# Patient Record
Sex: FEMALE | Race: WHITE | NOT HISPANIC OR LATINO
[De-identification: names, ages, dates, MRNs, and addresses within clinical notes are randomized per-mention and may not be internally consistent; named-entity substitution may affect disease eponyms.]

---

## 2017-06-29 ENCOUNTER — TRANSCRIPTION ENCOUNTER (OUTPATIENT)
Age: 27
End: 2017-06-29

## 2017-07-05 ENCOUNTER — ASOB RESULT (OUTPATIENT)
Age: 27
End: 2017-07-05

## 2017-07-05 ENCOUNTER — APPOINTMENT (OUTPATIENT)
Dept: ANTEPARTUM | Facility: CLINIC | Age: 27
End: 2017-07-05

## 2017-07-05 PROBLEM — Z00.00 ENCOUNTER FOR PREVENTIVE HEALTH EXAMINATION: Status: ACTIVE | Noted: 2017-07-05

## 2017-07-06 PROBLEM — O35.9XX0 FETAL ABNORMALITY IN PREGNANCY: Status: ACTIVE | Noted: 2017-07-06

## 2017-07-10 ENCOUNTER — LABORATORY RESULT (OUTPATIENT)
Age: 27
End: 2017-07-10

## 2017-07-10 ENCOUNTER — APPOINTMENT (OUTPATIENT)
Dept: ANTEPARTUM | Facility: CLINIC | Age: 27
End: 2017-07-10

## 2017-07-10 ENCOUNTER — ASOB RESULT (OUTPATIENT)
Age: 27
End: 2017-07-10

## 2017-07-10 ENCOUNTER — OUTPATIENT (OUTPATIENT)
Dept: OUTPATIENT SERVICES | Facility: HOSPITAL | Age: 27
LOS: 1 days | End: 2017-07-10
Payer: COMMERCIAL

## 2017-07-10 DIAGNOSIS — Z01.818 ENCOUNTER FOR OTHER PREPROCEDURAL EXAMINATION: ICD-10-CM

## 2017-07-10 PROCEDURE — 88291 CYTO/MOLECULAR REPORT: CPT

## 2017-07-11 ENCOUNTER — OUTPATIENT (OUTPATIENT)
Dept: OUTPATIENT SERVICES | Age: 27
LOS: 1 days | Discharge: HOME | End: 2017-07-11

## 2017-07-11 DIAGNOSIS — Z3A.21 21 WEEKS GESTATION OF PREGNANCY: ICD-10-CM

## 2017-07-11 DIAGNOSIS — O35.8XX0 MATERNAL CARE FOR OTHER (SUSPECTED) FETAL ABNORMALITY AND DAMAGE, NOT APPLICABLE OR UNSPECIFIED: ICD-10-CM

## 2017-07-12 ENCOUNTER — APPOINTMENT (OUTPATIENT)
Dept: PEDIATRIC CARDIOLOGY | Facility: CLINIC | Age: 27
End: 2017-07-12

## 2017-07-12 DIAGNOSIS — O35.9XX0 MATERNAL CARE FOR (SUSPECTED) FETAL ABNORMALITY AND DAMAGE, UNSPECIFIED, NOT APPLICABLE OR UNSPECIFIED: ICD-10-CM

## 2017-07-17 LAB
CHROM ANALY OVERALL INTERP SPEC-IMP: SIGNIFICANT CHANGE UP

## 2017-11-21 ENCOUNTER — INPATIENT (INPATIENT)
Facility: HOSPITAL | Age: 27
LOS: 2 days | Discharge: HOME | End: 2017-11-24
Attending: OBSTETRICS & GYNECOLOGY | Admitting: OBSTETRICS & GYNECOLOGY
Payer: COMMERCIAL

## 2017-11-21 RX ORDER — SODIUM CHLORIDE 9 MG/ML
1000 INJECTION, SOLUTION INTRAVENOUS
Refills: 0 | Status: DISCONTINUED | OUTPATIENT
Start: 2017-11-21 | End: 2017-11-22

## 2017-11-21 RX ORDER — OXYTOCIN 10 UNIT/ML
333.33 VIAL (ML) INJECTION
Qty: 20 | Refills: 0 | Status: DISCONTINUED | OUTPATIENT
Start: 2017-11-21 | End: 2017-11-22

## 2017-11-21 RX ORDER — CITRIC ACID/SODIUM CITRATE 300-500 MG
15 SOLUTION, ORAL ORAL EVERY 4 HOURS
Refills: 0 | Status: DISCONTINUED | OUTPATIENT
Start: 2017-11-21 | End: 2017-11-22

## 2017-11-21 RX ORDER — AMPICILLIN TRIHYDRATE 250 MG
1 CAPSULE ORAL EVERY 4 HOURS
Refills: 0 | Status: DISCONTINUED | OUTPATIENT
Start: 2017-11-21 | End: 2017-11-24

## 2017-11-21 RX ORDER — AMPICILLIN TRIHYDRATE 250 MG
2 CAPSULE ORAL ONCE
Refills: 0 | Status: COMPLETED | OUTPATIENT
Start: 2017-11-21 | End: 2017-11-22

## 2017-11-21 RX ORDER — SODIUM CHLORIDE 9 MG/ML
1000 INJECTION, SOLUTION INTRAVENOUS ONCE
Refills: 0 | Status: COMPLETED | OUTPATIENT
Start: 2017-11-21 | End: 2017-11-21

## 2017-11-21 RX ADMIN — SODIUM CHLORIDE 2000 MILLILITER(S): 9 INJECTION, SOLUTION INTRAVENOUS at 23:45

## 2017-11-22 VITALS — WEIGHT: 138.89 LBS | HEIGHT: 65 IN

## 2017-11-22 LAB
BASOPHILS # BLD AUTO: 0 K/UL — SIGNIFICANT CHANGE UP (ref 0–0.2)
BASOPHILS NFR BLD AUTO: 0.4 % — SIGNIFICANT CHANGE UP (ref 0–2)
BLD GP AB SCN SERPL QL: NEGATIVE — SIGNIFICANT CHANGE UP
EOSINOPHIL # BLD AUTO: 0.1 K/UL — SIGNIFICANT CHANGE UP (ref 0–0.5)
EOSINOPHIL NFR BLD AUTO: 0.5 % — SIGNIFICANT CHANGE UP (ref 0–6)
HCT VFR BLD CALC: 36.7 % — SIGNIFICANT CHANGE UP (ref 34.5–45)
HGB BLD-MCNC: 12.2 G/DL — SIGNIFICANT CHANGE UP (ref 11.5–15.5)
LYMPHOCYTES # BLD AUTO: 2.6 K/UL — SIGNIFICANT CHANGE UP (ref 1–3.3)
LYMPHOCYTES # BLD AUTO: 22.5 % — SIGNIFICANT CHANGE UP (ref 13–44)
MCHC RBC-ENTMCNC: 29.6 PG — SIGNIFICANT CHANGE UP (ref 27–34)
MCHC RBC-ENTMCNC: 33.3 GM/DL — SIGNIFICANT CHANGE UP (ref 32–36)
MCV RBC AUTO: 88.9 FL — SIGNIFICANT CHANGE UP (ref 80–100)
MONOCYTES # BLD AUTO: 0.9 K/UL — SIGNIFICANT CHANGE UP (ref 0–0.9)
MONOCYTES NFR BLD AUTO: 7.5 % — SIGNIFICANT CHANGE UP (ref 2–14)
NEUTROPHILS # BLD AUTO: 8.1 K/UL — HIGH (ref 1.8–7.4)
NEUTROPHILS NFR BLD AUTO: 69.1 % — SIGNIFICANT CHANGE UP (ref 43–77)
PLATELET # BLD AUTO: 221 K/UL — SIGNIFICANT CHANGE UP (ref 150–400)
RBC # BLD: 4.14 M/UL — SIGNIFICANT CHANGE UP (ref 3.8–5.2)
RBC # FLD: 15.6 % — HIGH (ref 10.3–14.5)
RH IG SCN BLD-IMP: POSITIVE — SIGNIFICANT CHANGE UP
T PALLIDUM AB TITR SER: NEGATIVE — SIGNIFICANT CHANGE UP
WBC # BLD: 11.7 K/UL — HIGH (ref 3.8–10.5)
WBC # FLD AUTO: 11.7 K/UL — HIGH (ref 3.8–10.5)

## 2017-11-22 RX ORDER — LANOLIN
1 OINTMENT (GRAM) TOPICAL EVERY 6 HOURS
Refills: 0 | Status: DISCONTINUED | OUTPATIENT
Start: 2017-11-22 | End: 2017-11-24

## 2017-11-22 RX ORDER — OXYTOCIN 10 UNIT/ML
4 VIAL (ML) INJECTION
Qty: 30 | Refills: 0 | Status: DISCONTINUED | OUTPATIENT
Start: 2017-11-22 | End: 2017-11-24

## 2017-11-22 RX ORDER — ONDANSETRON 8 MG/1
4 TABLET, FILM COATED ORAL ONCE
Refills: 0 | Status: COMPLETED | OUTPATIENT
Start: 2017-11-22 | End: 2017-11-22

## 2017-11-22 RX ORDER — SODIUM CHLORIDE 9 MG/ML
3 INJECTION INTRAMUSCULAR; INTRAVENOUS; SUBCUTANEOUS EVERY 8 HOURS
Refills: 0 | Status: DISCONTINUED | OUTPATIENT
Start: 2017-11-22 | End: 2017-11-24

## 2017-11-22 RX ORDER — PRAMOXINE HYDROCHLORIDE 150 MG/15G
1 AEROSOL, FOAM RECTAL EVERY 4 HOURS
Refills: 0 | Status: DISCONTINUED | OUTPATIENT
Start: 2017-11-22 | End: 2017-11-24

## 2017-11-22 RX ORDER — SIMETHICONE 80 MG/1
80 TABLET, CHEWABLE ORAL EVERY 6 HOURS
Refills: 0 | Status: DISCONTINUED | OUTPATIENT
Start: 2017-11-22 | End: 2017-11-24

## 2017-11-22 RX ORDER — DIPHENHYDRAMINE HCL 50 MG
25 CAPSULE ORAL ONCE
Refills: 0 | Status: COMPLETED | OUTPATIENT
Start: 2017-11-22 | End: 2017-11-22

## 2017-11-22 RX ORDER — DIBUCAINE 1 %
1 OINTMENT (GRAM) RECTAL EVERY 4 HOURS
Refills: 0 | Status: DISCONTINUED | OUTPATIENT
Start: 2017-11-22 | End: 2017-11-22

## 2017-11-22 RX ORDER — HYDROCORTISONE 1 %
1 OINTMENT (GRAM) TOPICAL EVERY 4 HOURS
Refills: 0 | Status: DISCONTINUED | OUTPATIENT
Start: 2017-11-22 | End: 2017-11-22

## 2017-11-22 RX ORDER — KETOROLAC TROMETHAMINE 30 MG/ML
30 SYRINGE (ML) INJECTION ONCE
Refills: 0 | Status: DISCONTINUED | OUTPATIENT
Start: 2017-11-22 | End: 2017-11-22

## 2017-11-22 RX ORDER — GLYCERIN ADULT
1 SUPPOSITORY, RECTAL RECTAL AT BEDTIME
Refills: 0 | Status: DISCONTINUED | OUTPATIENT
Start: 2017-11-22 | End: 2017-11-24

## 2017-11-22 RX ORDER — SODIUM CHLORIDE 9 MG/ML
3 INJECTION INTRAMUSCULAR; INTRAVENOUS; SUBCUTANEOUS EVERY 8 HOURS
Refills: 0 | Status: DISCONTINUED | OUTPATIENT
Start: 2017-11-22 | End: 2017-11-22

## 2017-11-22 RX ORDER — IBUPROFEN 200 MG
600 TABLET ORAL EVERY 6 HOURS
Refills: 0 | Status: DISCONTINUED | OUTPATIENT
Start: 2017-11-22 | End: 2017-11-24

## 2017-11-22 RX ORDER — DIBUCAINE 1 %
1 OINTMENT (GRAM) RECTAL EVERY 4 HOURS
Refills: 0 | Status: DISCONTINUED | OUTPATIENT
Start: 2017-11-22 | End: 2017-11-24

## 2017-11-22 RX ORDER — ACETAMINOPHEN 500 MG
975 TABLET ORAL EVERY 6 HOURS
Refills: 0 | Status: DISCONTINUED | OUTPATIENT
Start: 2017-11-22 | End: 2017-11-24

## 2017-11-22 RX ORDER — MAGNESIUM HYDROXIDE 400 MG/1
30 TABLET, CHEWABLE ORAL
Refills: 0 | Status: DISCONTINUED | OUTPATIENT
Start: 2017-11-22 | End: 2017-11-24

## 2017-11-22 RX ORDER — OXYCODONE HYDROCHLORIDE 5 MG/1
5 TABLET ORAL
Refills: 0 | Status: DISCONTINUED | OUTPATIENT
Start: 2017-11-22 | End: 2017-11-24

## 2017-11-22 RX ORDER — DIPHENHYDRAMINE HCL 50 MG
25 CAPSULE ORAL EVERY 6 HOURS
Refills: 0 | Status: DISCONTINUED | OUTPATIENT
Start: 2017-11-22 | End: 2017-11-24

## 2017-11-22 RX ORDER — HYDROCORTISONE 1 %
1 OINTMENT (GRAM) TOPICAL EVERY 4 HOURS
Refills: 0 | Status: DISCONTINUED | OUTPATIENT
Start: 2017-11-22 | End: 2017-11-24

## 2017-11-22 RX ORDER — PRAMOXINE HYDROCHLORIDE 150 MG/15G
1 AEROSOL, FOAM RECTAL EVERY 4 HOURS
Refills: 0 | Status: DISCONTINUED | OUTPATIENT
Start: 2017-11-22 | End: 2017-11-22

## 2017-11-22 RX ORDER — DOCUSATE SODIUM 100 MG
100 CAPSULE ORAL
Refills: 0 | Status: DISCONTINUED | OUTPATIENT
Start: 2017-11-22 | End: 2017-11-24

## 2017-11-22 RX ORDER — TETANUS TOXOID, REDUCED DIPHTHERIA TOXOID AND ACELLULAR PERTUSSIS VACCINE, ADSORBED 5; 2.5; 8; 8; 2.5 [IU]/.5ML; [IU]/.5ML; UG/.5ML; UG/.5ML; UG/.5ML
0.5 SUSPENSION INTRAMUSCULAR ONCE
Refills: 0 | Status: DISCONTINUED | OUTPATIENT
Start: 2017-11-22 | End: 2017-11-24

## 2017-11-22 RX ORDER — OXYTOCIN 10 UNIT/ML
41.67 VIAL (ML) INJECTION
Qty: 20 | Refills: 0 | Status: DISCONTINUED | OUTPATIENT
Start: 2017-11-22 | End: 2017-11-24

## 2017-11-22 RX ORDER — AER TRAVELER 0.5 G/1
1 SOLUTION RECTAL; TOPICAL EVERY 4 HOURS
Refills: 0 | Status: DISCONTINUED | OUTPATIENT
Start: 2017-11-22 | End: 2017-11-24

## 2017-11-22 RX ORDER — AER TRAVELER 0.5 G/1
1 SOLUTION RECTAL; TOPICAL EVERY 4 HOURS
Refills: 0 | Status: DISCONTINUED | OUTPATIENT
Start: 2017-11-22 | End: 2017-11-22

## 2017-11-22 RX ORDER — OXYCODONE HYDROCHLORIDE 5 MG/1
5 TABLET ORAL EVERY 4 HOURS
Refills: 0 | Status: DISCONTINUED | OUTPATIENT
Start: 2017-11-22 | End: 2017-11-24

## 2017-11-22 RX ORDER — IBUPROFEN 200 MG
600 TABLET ORAL EVERY 6 HOURS
Refills: 0 | Status: COMPLETED | OUTPATIENT
Start: 2017-11-22 | End: 2018-10-21

## 2017-11-22 RX ADMIN — ONDANSETRON 4 MILLIGRAM(S): 8 TABLET, FILM COATED ORAL at 08:46

## 2017-11-22 RX ADMIN — SODIUM CHLORIDE 125 MILLILITER(S): 9 INJECTION, SOLUTION INTRAVENOUS at 00:18

## 2017-11-22 RX ADMIN — Medication 600 MILLIGRAM(S): at 21:30

## 2017-11-22 RX ADMIN — Medication 15 MILLILITER(S): at 02:14

## 2017-11-22 RX ADMIN — Medication 125 MILLIUNIT(S)/MIN: at 11:07

## 2017-11-22 RX ADMIN — Medication 4 MILLIUNIT(S)/MIN: at 04:15

## 2017-11-22 RX ADMIN — ONDANSETRON 4 MILLIGRAM(S): 8 TABLET, FILM COATED ORAL at 13:36

## 2017-11-22 RX ADMIN — Medication 25 MILLIGRAM(S): at 06:30

## 2017-11-22 RX ADMIN — Medication 0.2 MILLIGRAM(S): at 20:33

## 2017-11-22 RX ADMIN — Medication 0.2 MILLIGRAM(S): at 10:23

## 2017-11-22 RX ADMIN — Medication 208 GRAM(S): at 04:10

## 2017-11-22 RX ADMIN — Medication 216 GRAM(S): at 00:09

## 2017-11-22 RX ADMIN — Medication 0.2 MILLIGRAM(S): at 16:31

## 2017-11-22 RX ADMIN — Medication 208 GRAM(S): at 08:01

## 2017-11-22 RX ADMIN — Medication 600 MILLIGRAM(S): at 20:33

## 2017-11-22 RX ADMIN — Medication 30 MILLIGRAM(S): at 14:09

## 2017-11-23 LAB
HCT VFR BLD CALC: 31.5 % — LOW (ref 34.5–45)
HGB BLD-MCNC: 10.4 G/DL — LOW (ref 11.5–15.5)

## 2017-11-23 RX ADMIN — Medication 0.2 MILLIGRAM(S): at 11:11

## 2017-11-23 RX ADMIN — Medication 0.2 MILLIGRAM(S): at 00:57

## 2017-11-23 RX ADMIN — Medication 600 MILLIGRAM(S): at 11:14

## 2017-11-23 RX ADMIN — Medication 0.2 MILLIGRAM(S): at 05:06

## 2017-11-23 RX ADMIN — Medication 1 TABLET(S): at 11:11

## 2017-11-23 RX ADMIN — Medication 600 MILLIGRAM(S): at 12:00

## 2017-11-23 RX ADMIN — Medication 600 MILLIGRAM(S): at 05:05

## 2017-11-23 RX ADMIN — Medication 600 MILLIGRAM(S): at 06:16

## 2017-11-23 NOTE — PROGRESS NOTE ADULT - SUBJECTIVE AND OBJECTIVE BOX
PPD#1- ATTENDING NOTE    S: Patient doing well. Minimal lochia. Pain controlled.    O: Vital Signs Last 24 Hrs  T(C): 36.8 (23 Nov 2017 05:22), Max: 36.9 (22 Nov 2017 15:20)  T(F): 98.2 (23 Nov 2017 05:22), Max: 98.4 (22 Nov 2017 15:20)  HR: 76 (23 Nov 2017 05:22) (76 - 102)  BP: 109/70 (23 Nov 2017 05:22) (13/- - 150/76)  BP(mean): --  RR: 18 (23 Nov 2017 05:22) (15 - 18)  SpO2: 97% (22 Nov 2017 17:05) (94% - 98%)    Gen: NAD  Abd: soft, NT, ND, fundus firm below umbilicus -2  Lochia: moderate  Ext: no tenderness, no hyper reflexia,     Labs:                        10.4   x     )-----------( x        ( 23 Nov 2017 08:57 )             31.5       A:Office [phone]  Dr. Chavez PPD#1- ATTENDING NOTE    S: Patient doing well. Minimal lochia. Pain controlled.    O: Vital Signs Last 24 Hrs  T(C): 36.8 (23 Nov 2017 05:22), Max: 36.9 (22 Nov 2017 15:20)  T(F): 98.2 (23 Nov 2017 05:22), Max: 98.4 (22 Nov 2017 15:20)  HR: 76 (23 Nov 2017 05:22) (76 - 102)  BP: 109/70 (23 Nov 2017 05:22) (13/- - 150/76)  BP(mean): --  RR: 18 (23 Nov 2017 05:22) (15 - 18)  SpO2: 97% (22 Nov 2017 17:05) (94% - 98%)    Gen: NAD  Abd: soft, NT, ND, fundus firm below umbilicus -2  Lochia: moderate  Ext: no tenderness, no hyper reflexia,     Labs:                        10.4   x     )-----------( x        ( 23 Nov 2017 08:57 )             31.5       A:Office [phone]  Dr. Chavez PPD#1- ATTENDING NOTE    S: Patient doing well. Minimal lochia. Pain controlled.    O: Vital Signs Last 24 Hrs  T(C): 36.8 (23 Nov 2017 05:22), Max: 36.9 (22 Nov 2017 15:20)  T(F): 98.2 (23 Nov 2017 05:22), Max: 98.4 (22 Nov 2017 15:20)  HR: 76 (23 Nov 2017 05:22) (76 - 102)  BP: 109/70 (23 Nov 2017 05:22) (13/- - 150/76)  BP(mean): --  RR: 18 (23 Nov 2017 05:22) (15 - 18)  SpO2: 97% (22 Nov 2017 17:05) (94% - 98%)    Gen: NAD  Abd: soft, NT, ND, fundus firm below umbilicus -2  Lochia: moderate  Ext: no tenderness, no hyper reflexia,     Labs:                        10.4   x     )-----------( x        ( 23 Nov 2017 08:57 )             31.5       A:Office [phone]  Dr. Chavez

## 2017-11-23 NOTE — PROGRESS NOTE ADULT - PROBLEM SELECTOR PLAN 1
27y PPD#1 s/p  doing well.    Plan:  Analgesia prn  Regular diet  Follow up am labs  Routine post partum care
Increase OOB  Regular diet  PO Pain protocol  AM H&H  Routine Postpartum Care  Continue Methergine series, fundus firm, bleeding WNL    Jessica Ledesma PA-C

## 2017-11-23 NOTE — PROGRESS NOTE ADULT - SUBJECTIVE AND OBJECTIVE BOX
Postpartum Note- PPD#1    Allergies    No Known Allergies    Blood Type O   Positive    RPR : Negative    Rubella: Immune    S: Patient is a  27y    P 1001     PPD#1         S/P    Patient states she still feels her bleeding is heavy, pain is controlled.    Pt is OOB, tolerating PO, passing flatus.     Feeding: Breast    O:  Vital Signs Last 24 Hrs  T(C): 36.8 (2017 05:22), Max: 36.9 (2017 15:20)  T(F): 98.2 (2017 05:22), Max: 98.4 (2017 15:20)  HR: 76 (2017 05:22) (76 - 102)  BP: 109/70 (2017 05:22) (13/- - 150/76)  BP(mean): --  RR: 18 (2017 05:22) (15 - 18)  SpO2: 97% (2017 17:05) (94% - 98%)     Gen: NAD  Abdomen: Soft, nontender, non-distended, fundus firm.  Vaginal: Lochia WNL  Ext: Neg calf tenderness    LABS:    Hemoglobin: 12.2 g/dL (11-22 @ 00:10)      Hematocrit: 36.7 % (11- @ 00:10)        PMHx: none  Current Issues: Atony at delivery with --> pt currently on methergine series

## 2017-11-24 ENCOUNTER — TRANSCRIPTION ENCOUNTER (OUTPATIENT)
Age: 27
End: 2017-11-24

## 2017-11-24 VITALS
TEMPERATURE: 98 F | HEART RATE: 87 BPM | DIASTOLIC BLOOD PRESSURE: 69 MMHG | SYSTOLIC BLOOD PRESSURE: 106 MMHG | RESPIRATION RATE: 18 BRPM

## 2017-11-24 RX ORDER — ACETAMINOPHEN 500 MG
3 TABLET ORAL
Qty: 0 | Refills: 0 | DISCHARGE
Start: 2017-11-24

## 2017-11-24 RX ORDER — IBUPROFEN 200 MG
1 TABLET ORAL
Qty: 0 | Refills: 0 | DISCHARGE
Start: 2017-11-24

## 2017-11-24 RX ADMIN — Medication 600 MILLIGRAM(S): at 06:06

## 2017-11-24 RX ADMIN — Medication 600 MILLIGRAM(S): at 05:19

## 2017-11-24 NOTE — DISCHARGE NOTE OB - PATIENT PORTAL LINK FT
“You can access the FollowHealth Patient Portal, offered by [facility], by registering with the following website: [email or web address]” “You can access the FollowHealth Patient Portal, offered by [facility], by registering with the following website: [email or web address]” “You can access the FollowHealth Patient Portal, offered by [facility], by registering with the following website: [email or web address]”

## 2017-11-24 NOTE — PROGRESS NOTE ADULT - SUBJECTIVE AND OBJECTIVE BOX
BROOKLYN PUENTE  MRN-[id number]    Subjective:DOING WELL. HOME TODAY    Vital Signs Last 24 Hrs  T(C): 36.8 (2017 05:37), Max: 36.9 (2017 18:00)  T(F): 98.2 (2017 05:37), Max: 98.5 (2017 18:00)  HR: 87 (2017 05:37) (80 - 87)  BP: 106/69 (2017 05:37) (105/71 - 106/69)  BP(mean): --  RR: 18 (2017 05:37) (18 - 18)  SpO2: --    PHYSICAL EXAM:      Constitutional:W/D, WF, NAD        Abdomen: Firm fundus  Pelvic: Lochia mild                                        REVIEW OF SYSTEMS      General:	    Skin/Breast:  		  Gastrointestinal:	    Genitourinary:	      MEDICATIONS  (STANDING):  acetaminophen   Tablet. 975 milliGRAM(s) Oral every 6 hours  ampicillin  IVPB 1 Gram(s) IV Intermittent every 4 hours  diphtheria/tetanus/pertussis (acellular) Vaccine (ADAcel) 0.5 milliLiter(s) IntraMuscular once  ibuprofen  Tablet 600 milliGRAM(s) Oral every 6 hours  misoprostol 1000 MICROGram(s) Vaginal once  oxyCODONE    IR 5 milliGRAM(s) Oral every 3 hours  oxytocin Infusion 41.667 milliUNIT(s)/Min (125 mL/Hr) IV Continuous <Continuous>  oxytocin Infusion 4 milliUNIT(s)/Min (4 mL/Hr) IV Continuous <Continuous>  prenatal multivitamin 1 Tablet(s) Oral daily  sodium chloride 0.9% lock flush 3 milliLiter(s) IV Push every 8 hours    MEDICATIONS  (PRN):  dibucaine 1% Ointment 1 Application(s) Topical every 4 hours PRN Perineal Discomfort  diphenhydrAMINE   Capsule 25 milliGRAM(s) Oral every 6 hours PRN Itching  docusate sodium 100 milliGRAM(s) Oral two times a day PRN Stool Softening  glycerin Suppository - Adult 1 Suppository(s) Rectal at bedtime PRN Constipation  hydrocortisone 1% Cream 1 Application(s) Topical every 4 hours PRN Moderate to Severe Perineal Pain  lanolin Ointment 1 Application(s) Topical every 6 hours PRN Sore Nipples  magnesium hydroxide Suspension 30 milliLiter(s) Oral two times a day PRN Constipation  oxyCODONE    IR 5 milliGRAM(s) Oral every 4 hours PRN Severe Pain (7 -10)  pramoxine 1%/zinc 5% Cream 1 Application(s) Topical every 4 hours PRN Moderate to Severe Perineal Pain  simethicone 80 milliGRAM(s) Chew every 6 hours PRN Gas  witch hazel Pads 1 Application(s) Topical every 4 hours PRN Perineal Discomfort    Allergies    No Known Allergies    Intolerances        Impression:PPD#2 - STABLE        Plan:D/C HOME. INSTR GIVEN. F/U 6 WEEKS BROOKLYN PUENTE  MRN-[id number]    Subjective:DOING WELL. HOME TODAY    Vital Signs Last 24 Hrs  T(C): 36.8 (2017 05:37), Max: 36.9 (2017 18:00)  T(F): 98.2 (2017 05:37), Max: 98.5 (2017 18:00)  HR: 87 (2017 05:37) (80 - 87)  BP: 106/69 (2017 05:37) (105/71 - 106/69)  BP(mean): --  RR: 18 (2017 05:37) (18 - 18)  SpO2: --    PHYSICAL EXAM:      Constitutional:W/D, WF, NAD        Abdomen: Firm fundus  Pelvic: Lochia mild                                        REVIEW OF SYSTEMS      General:	    Skin/Breast:  		  Gastrointestinal:	    Genitourinary:	      MEDICATIONS  (STANDING):  acetaminophen   Tablet. 975 milliGRAM(s) Oral every 6 hours  ampicillin  IVPB 1 Gram(s) IV Intermittent every 4 hours  diphtheria/tetanus/pertussis (acellular) Vaccine (ADAcel) 0.5 milliLiter(s) IntraMuscular once  ibuprofen  Tablet 600 milliGRAM(s) Oral every 6 hours  misoprostol 1000 MICROGram(s) Vaginal once  oxyCODONE    IR 5 milliGRAM(s) Oral every 3 hours  oxytocin Infusion 41.667 milliUNIT(s)/Min (125 mL/Hr) IV Continuous <Continuous>  oxytocin Infusion 4 milliUNIT(s)/Min (4 mL/Hr) IV Continuous <Continuous>  prenatal multivitamin 1 Tablet(s) Oral daily  sodium chloride 0.9% lock flush 3 milliLiter(s) IV Push every 8 hours    MEDICATIONS  (PRN):  dibucaine 1% Ointment 1 Application(s) Topical every 4 hours PRN Perineal Discomfort  diphenhydrAMINE   Capsule 25 milliGRAM(s) Oral every 6 hours PRN Itching  docusate sodium 100 milliGRAM(s) Oral two times a day PRN Stool Softening  glycerin Suppository - Adult 1 Suppository(s) Rectal at bedtime PRN Constipation  hydrocortisone 1% Cream 1 Application(s) Topical every 4 hours PRN Moderate to Severe Perineal Pain  lanolin Ointment 1 Application(s) Topical every 6 hours PRN Sore Nipples  magnesium hydroxide Suspension 30 milliLiter(s) Oral two times a day PRN Constipation  oxyCODONE    IR 5 milliGRAM(s) Oral every 4 hours PRN Severe Pain (7 -10)  pramoxine 1%/zinc 5% Cream 1 Application(s) Topical every 4 hours PRN Moderate to Severe Perineal Pain  simethicone 80 milliGRAM(s) Chew every 6 hours PRN Gas  witch hazel Pads 1 Application(s) Topical every 4 hours PRN Perineal Discomfort    Allergies    No Known Allergies    Intolerances        Impression:PPD#2 - STABLE        Plan:D/C HOME. INSTR GIVEN. F/U 6 WEEKS BROOKLYN PUENTE  MRN-[id number]    Subjective:DOING WELL. HOME TODAY    Vital Signs Last 24 Hrs  T(C): 36.8 (2017 05:37), Max: 36.9 (2017 18:00)  T(F): 98.2 (2017 05:37), Max: 98.5 (2017 18:00)  HR: 87 (2017 05:37) (80 - 87)  BP: 106/69 (2017 05:37) (105/71 - 106/69)  BP(mean): --  RR: 18 (2017 05:37) (18 - 18)  SpO2: --    PHYSICAL EXAM:      Constitutional:W/D, WF, NAD        Abdomen: Firm fundus  Pelvic: Lochia mild                                        REVIEW OF SYSTEMS      General:	    Skin/Breast:  		  Gastrointestinal:	    Genitourinary:	      MEDICATIONS  (STANDING):  acetaminophen   Tablet. 975 milliGRAM(s) Oral every 6 hours  ampicillin  IVPB 1 Gram(s) IV Intermittent every 4 hours  diphtheria/tetanus/pertussis (acellular) Vaccine (ADAcel) 0.5 milliLiter(s) IntraMuscular once  ibuprofen  Tablet 600 milliGRAM(s) Oral every 6 hours  misoprostol 1000 MICROGram(s) Vaginal once  oxyCODONE    IR 5 milliGRAM(s) Oral every 3 hours  oxytocin Infusion 41.667 milliUNIT(s)/Min (125 mL/Hr) IV Continuous <Continuous>  oxytocin Infusion 4 milliUNIT(s)/Min (4 mL/Hr) IV Continuous <Continuous>  prenatal multivitamin 1 Tablet(s) Oral daily  sodium chloride 0.9% lock flush 3 milliLiter(s) IV Push every 8 hours    MEDICATIONS  (PRN):  dibucaine 1% Ointment 1 Application(s) Topical every 4 hours PRN Perineal Discomfort  diphenhydrAMINE   Capsule 25 milliGRAM(s) Oral every 6 hours PRN Itching  docusate sodium 100 milliGRAM(s) Oral two times a day PRN Stool Softening  glycerin Suppository - Adult 1 Suppository(s) Rectal at bedtime PRN Constipation  hydrocortisone 1% Cream 1 Application(s) Topical every 4 hours PRN Moderate to Severe Perineal Pain  lanolin Ointment 1 Application(s) Topical every 6 hours PRN Sore Nipples  magnesium hydroxide Suspension 30 milliLiter(s) Oral two times a day PRN Constipation  oxyCODONE    IR 5 milliGRAM(s) Oral every 4 hours PRN Severe Pain (7 -10)  pramoxine 1%/zinc 5% Cream 1 Application(s) Topical every 4 hours PRN Moderate to Severe Perineal Pain  simethicone 80 milliGRAM(s) Chew every 6 hours PRN Gas  witch hazel Pads 1 Application(s) Topical every 4 hours PRN Perineal Discomfort    Allergies    No Known Allergies    Intolerances        Impression:PPD#2 - STABLE        Plan:D/C HOME. INSTR GIVEN. F/U 6 WEEKS

## 2017-11-24 NOTE — DISCHARGE NOTE OB - CARE PLAN
Principal Discharge DX:	Vaginal delivery  Goal:	D/C HOME  Instructions for follow-up, activity and diet:	PPV 6 WEEKS

## 2017-11-24 NOTE — DISCHARGE NOTE OB - MEDICATION SUMMARY - MEDICATIONS TO TAKE
I will START or STAY ON the medications listed below when I get home from the hospital:    acetaminophen 325 mg oral tablet  -- 3 tab(s) by mouth every 6 hours  -- Indication: For Vaginal delivery    ibuprofen 600 mg oral tablet  -- 1 tab(s) by mouth every 6 hours  -- Indication: For Vaginal delivery    PNV Prenatal oral tablet  -- 1 tab(s) by mouth once a day  -- Indication: For Vaginal delivery

## 2017-11-24 NOTE — DISCHARGE NOTE OB - CARE PROVIDER_API CALL
Alfonso Goodwin), Obstetrics and Gynecology  [address] [place] [address]  Phone: [phone]  Fax: [phone] Alfonso Goodwin), Obstetrics and Gynecology  [address] [place] [address]  Phone: [phone]  Fax: [phone] Alfonso Goodwin), Obstetrics and Gynecology  [address] [place] [address]  Phone: [phone]  Fax: [phone]

## 2017-11-24 NOTE — DISCHARGE NOTE OB - CARE PROVIDERS DIRECT ADDRESSES
[email or web address]

## 2018-01-11 ENCOUNTER — RESULT REVIEW (OUTPATIENT)
Age: 28
End: 2018-01-11

## 2018-09-24 ENCOUNTER — APPOINTMENT (OUTPATIENT)
Dept: OBGYN | Facility: CLINIC | Age: 28
End: 2018-09-24

## 2018-09-27 ENCOUNTER — OUTPATIENT (OUTPATIENT)
Dept: OUTPATIENT SERVICES | Facility: HOSPITAL | Age: 28
LOS: 1 days | Discharge: HOME | End: 2018-09-27

## 2018-09-28 DIAGNOSIS — F41.1 GENERALIZED ANXIETY DISORDER: ICD-10-CM

## 2019-03-24 ENCOUNTER — TRANSCRIPTION ENCOUNTER (OUTPATIENT)
Age: 29
End: 2019-03-24

## 2019-05-01 ENCOUNTER — RESULT REVIEW (OUTPATIENT)
Age: 29
End: 2019-05-01

## 2019-12-11 ENCOUNTER — ASOB RESULT (OUTPATIENT)
Age: 29
End: 2019-12-11

## 2019-12-11 ENCOUNTER — APPOINTMENT (OUTPATIENT)
Dept: ANTEPARTUM | Facility: CLINIC | Age: 29
End: 2019-12-11
Payer: COMMERCIAL

## 2019-12-11 PROCEDURE — 76805 OB US >/= 14 WKS SNGL FETUS: CPT

## 2020-01-08 ENCOUNTER — APPOINTMENT (OUTPATIENT)
Dept: ANTEPARTUM | Facility: CLINIC | Age: 30
End: 2020-01-08
Payer: COMMERCIAL

## 2020-01-08 ENCOUNTER — ASOB RESULT (OUTPATIENT)
Age: 30
End: 2020-01-08

## 2020-01-08 PROCEDURE — 76811 OB US DETAILED SNGL FETUS: CPT

## 2020-01-08 PROCEDURE — 76817 TRANSVAGINAL US OBSTETRIC: CPT

## 2020-04-26 ENCOUNTER — MESSAGE (OUTPATIENT)
Age: 30
End: 2020-04-26

## 2020-05-22 ENCOUNTER — INPATIENT (INPATIENT)
Facility: HOSPITAL | Age: 30
LOS: 0 days | Discharge: HOME | End: 2020-05-23
Attending: OBSTETRICS & GYNECOLOGY | Admitting: OBSTETRICS & GYNECOLOGY
Payer: COMMERCIAL

## 2020-05-22 VITALS — WEIGHT: 138.89 LBS | HEIGHT: 65 IN

## 2020-05-22 LAB
BASOPHILS # BLD AUTO: 0 K/UL — SIGNIFICANT CHANGE UP (ref 0–0.2)
BASOPHILS NFR BLD AUTO: 0 % — SIGNIFICANT CHANGE UP (ref 0–2)
BLD GP AB SCN SERPL QL: NEGATIVE — SIGNIFICANT CHANGE UP
EOSINOPHIL # BLD AUTO: 0.19 K/UL — SIGNIFICANT CHANGE UP (ref 0–0.5)
EOSINOPHIL NFR BLD AUTO: 1.7 % — SIGNIFICANT CHANGE UP (ref 0–6)
HCT VFR BLD CALC: 32.4 % — LOW (ref 34.5–45)
HGB BLD-MCNC: 10.5 G/DL — LOW (ref 11.5–15.5)
LYMPHOCYTES # BLD AUTO: 2.56 K/UL — SIGNIFICANT CHANGE UP (ref 1–3.3)
LYMPHOCYTES # BLD AUTO: 23.5 % — SIGNIFICANT CHANGE UP (ref 13–44)
MANUAL SMEAR VERIFICATION: SIGNIFICANT CHANGE UP
MCHC RBC-ENTMCNC: 28.3 PG — SIGNIFICANT CHANGE UP (ref 27–34)
MCHC RBC-ENTMCNC: 32.4 GM/DL — SIGNIFICANT CHANGE UP (ref 32–36)
MCV RBC AUTO: 87.3 FL — SIGNIFICANT CHANGE UP (ref 80–100)
METAMYELOCYTES # FLD: 0.9 % — HIGH (ref 0–0)
MONOCYTES # BLD AUTO: 0.57 K/UL — SIGNIFICANT CHANGE UP (ref 0–0.9)
MONOCYTES NFR BLD AUTO: 5.2 % — SIGNIFICANT CHANGE UP (ref 2–14)
NEUTROPHILS # BLD AUTO: 7.49 K/UL — HIGH (ref 1.8–7.4)
NEUTROPHILS NFR BLD AUTO: 68.7 % — SIGNIFICANT CHANGE UP (ref 43–77)
PLAT MORPH BLD: NORMAL — SIGNIFICANT CHANGE UP
PLATELET # BLD AUTO: 236 K/UL — SIGNIFICANT CHANGE UP (ref 150–400)
RBC # BLD: 3.71 M/UL — LOW (ref 3.8–5.2)
RBC # FLD: 14.2 % — SIGNIFICANT CHANGE UP (ref 10.3–14.5)
RBC BLD AUTO: NORMAL — SIGNIFICANT CHANGE UP
RH IG SCN BLD-IMP: POSITIVE — SIGNIFICANT CHANGE UP
SARS-COV-2 RNA SPEC QL NAA+PROBE: SIGNIFICANT CHANGE UP
T PALLIDUM AB TITR SER: NEGATIVE — SIGNIFICANT CHANGE UP
WBC # BLD: 10.9 K/UL — HIGH (ref 3.8–10.5)
WBC # FLD AUTO: 10.9 K/UL — HIGH (ref 3.8–10.5)

## 2020-05-22 RX ORDER — TETANUS TOXOID, REDUCED DIPHTHERIA TOXOID AND ACELLULAR PERTUSSIS VACCINE, ADSORBED 5; 2.5; 8; 8; 2.5 [IU]/.5ML; [IU]/.5ML; UG/.5ML; UG/.5ML; UG/.5ML
0.5 SUSPENSION INTRAMUSCULAR ONCE
Refills: 0 | Status: DISCONTINUED | OUTPATIENT
Start: 2020-05-22 | End: 2020-05-23

## 2020-05-22 RX ORDER — DIBUCAINE 1 %
1 OINTMENT (GRAM) RECTAL EVERY 6 HOURS
Refills: 0 | Status: DISCONTINUED | OUTPATIENT
Start: 2020-05-22 | End: 2020-05-23

## 2020-05-22 RX ORDER — OXYTOCIN 10 UNIT/ML
4 VIAL (ML) INJECTION
Qty: 30 | Refills: 0 | Status: DISCONTINUED | OUTPATIENT
Start: 2020-05-22 | End: 2020-05-23

## 2020-05-22 RX ORDER — OXYTOCIN 10 UNIT/ML
333.33 VIAL (ML) INJECTION
Qty: 20 | Refills: 0 | Status: DISCONTINUED | OUTPATIENT
Start: 2020-05-22 | End: 2020-05-23

## 2020-05-22 RX ORDER — IBUPROFEN 200 MG
600 TABLET ORAL EVERY 6 HOURS
Refills: 0 | Status: DISCONTINUED | OUTPATIENT
Start: 2020-05-22 | End: 2020-05-23

## 2020-05-22 RX ORDER — ACETAMINOPHEN 500 MG
975 TABLET ORAL
Refills: 0 | Status: DISCONTINUED | OUTPATIENT
Start: 2020-05-22 | End: 2020-05-23

## 2020-05-22 RX ORDER — KETOROLAC TROMETHAMINE 30 MG/ML
30 SYRINGE (ML) INJECTION ONCE
Refills: 0 | Status: DISCONTINUED | OUTPATIENT
Start: 2020-05-22 | End: 2020-05-22

## 2020-05-22 RX ORDER — PRAMOXINE HYDROCHLORIDE 150 MG/15G
1 AEROSOL, FOAM RECTAL EVERY 4 HOURS
Refills: 0 | Status: DISCONTINUED | OUTPATIENT
Start: 2020-05-22 | End: 2020-05-23

## 2020-05-22 RX ORDER — SIMETHICONE 80 MG/1
80 TABLET, CHEWABLE ORAL EVERY 4 HOURS
Refills: 0 | Status: DISCONTINUED | OUTPATIENT
Start: 2020-05-22 | End: 2020-05-23

## 2020-05-22 RX ORDER — LANOLIN
1 OINTMENT (GRAM) TOPICAL EVERY 6 HOURS
Refills: 0 | Status: DISCONTINUED | OUTPATIENT
Start: 2020-05-22 | End: 2020-05-23

## 2020-05-22 RX ORDER — SODIUM CHLORIDE 9 MG/ML
1000 INJECTION, SOLUTION INTRAVENOUS
Refills: 0 | Status: DISCONTINUED | OUTPATIENT
Start: 2020-05-22 | End: 2020-05-22

## 2020-05-22 RX ORDER — BENZOCAINE 10 %
1 GEL (GRAM) MUCOUS MEMBRANE EVERY 6 HOURS
Refills: 0 | Status: DISCONTINUED | OUTPATIENT
Start: 2020-05-22 | End: 2020-05-23

## 2020-05-22 RX ORDER — OXYCODONE HYDROCHLORIDE 5 MG/1
5 TABLET ORAL
Refills: 0 | Status: DISCONTINUED | OUTPATIENT
Start: 2020-05-22 | End: 2020-05-23

## 2020-05-22 RX ORDER — MAGNESIUM HYDROXIDE 400 MG/1
30 TABLET, CHEWABLE ORAL
Refills: 0 | Status: DISCONTINUED | OUTPATIENT
Start: 2020-05-22 | End: 2020-05-23

## 2020-05-22 RX ORDER — SODIUM CHLORIDE 9 MG/ML
3 INJECTION INTRAMUSCULAR; INTRAVENOUS; SUBCUTANEOUS EVERY 8 HOURS
Refills: 0 | Status: DISCONTINUED | OUTPATIENT
Start: 2020-05-22 | End: 2020-05-23

## 2020-05-22 RX ORDER — CITRIC ACID/SODIUM CITRATE 300-500 MG
15 SOLUTION, ORAL ORAL EVERY 6 HOURS
Refills: 0 | Status: DISCONTINUED | OUTPATIENT
Start: 2020-05-22 | End: 2020-05-22

## 2020-05-22 RX ORDER — OXYCODONE HYDROCHLORIDE 5 MG/1
5 TABLET ORAL ONCE
Refills: 0 | Status: DISCONTINUED | OUTPATIENT
Start: 2020-05-22 | End: 2020-05-23

## 2020-05-22 RX ORDER — HYDROCORTISONE 1 %
1 OINTMENT (GRAM) TOPICAL EVERY 6 HOURS
Refills: 0 | Status: DISCONTINUED | OUTPATIENT
Start: 2020-05-22 | End: 2020-05-23

## 2020-05-22 RX ORDER — DIPHENHYDRAMINE HCL 50 MG
25 CAPSULE ORAL EVERY 6 HOURS
Refills: 0 | Status: DISCONTINUED | OUTPATIENT
Start: 2020-05-22 | End: 2020-05-23

## 2020-05-22 RX ORDER — AER TRAVELER 0.5 G/1
1 SOLUTION RECTAL; TOPICAL EVERY 4 HOURS
Refills: 0 | Status: DISCONTINUED | OUTPATIENT
Start: 2020-05-22 | End: 2020-05-23

## 2020-05-22 RX ORDER — IBUPROFEN 200 MG
600 TABLET ORAL EVERY 6 HOURS
Refills: 0 | Status: COMPLETED | OUTPATIENT
Start: 2020-05-22 | End: 2021-04-20

## 2020-05-22 RX ADMIN — Medication 975 MILLIGRAM(S): at 20:27

## 2020-05-22 RX ADMIN — Medication 30 MILLIGRAM(S): at 15:37

## 2020-05-22 RX ADMIN — Medication 4 MILLIUNIT(S)/MIN: at 09:00

## 2020-05-22 RX ADMIN — SODIUM CHLORIDE 125 MILLILITER(S): 9 INJECTION, SOLUTION INTRAVENOUS at 05:31

## 2020-05-22 RX ADMIN — SODIUM CHLORIDE 125 MILLILITER(S): 9 INJECTION, SOLUTION INTRAVENOUS at 05:30

## 2020-05-22 RX ADMIN — Medication 600 MILLIGRAM(S): at 23:53

## 2020-05-22 RX ADMIN — Medication 1000 MILLIUNIT(S)/MIN: at 14:51

## 2020-05-22 NOTE — PATIENT PROFILE OB - BELONGINGS, PROFILE
bracelet/cell phone/jewelry/ring/garment bag bracelet/cell phone/jewelry/ring/shoes/garment bag/plastic bag

## 2020-05-23 ENCOUNTER — TRANSCRIPTION ENCOUNTER (OUTPATIENT)
Age: 30
End: 2020-05-23

## 2020-05-23 VITALS
TEMPERATURE: 98 F | HEART RATE: 69 BPM | OXYGEN SATURATION: 97 % | SYSTOLIC BLOOD PRESSURE: 112 MMHG | RESPIRATION RATE: 18 BRPM | DIASTOLIC BLOOD PRESSURE: 74 MMHG

## 2020-05-23 RX ORDER — IBUPROFEN 200 MG
1 TABLET ORAL
Qty: 0 | Refills: 0 | DISCHARGE
Start: 2020-05-23

## 2020-05-23 RX ADMIN — MAGNESIUM HYDROXIDE 30 MILLILITER(S): 400 TABLET, CHEWABLE ORAL at 06:40

## 2020-05-23 RX ADMIN — Medication 975 MILLIGRAM(S): at 08:31

## 2020-05-23 RX ADMIN — Medication 1 TABLET(S): at 12:18

## 2020-05-23 RX ADMIN — Medication 975 MILLIGRAM(S): at 03:04

## 2020-05-23 RX ADMIN — Medication 600 MILLIGRAM(S): at 05:16

## 2020-05-23 NOTE — DISCHARGE NOTE OB - CARE PROVIDER_API CALL
Karina Nur  OBSTETRICS AND GYNECOLOGY  [address]  [place] [address]  Phone: [phone]  Fax: [phone]  Follow Up Time: Karina Nur  OBSTETRICS AND GYNECOLOGY  [address]  [place] [address]  Phone: [phone]  Fax: [phone]  Follow Up Time: Karina Nur  OBSTETRICS AND GYNECOLOGY  [address]  [place] [address]  Phone: [phone]  Fax: [phone]  Follow Up Time:

## 2020-05-23 NOTE — DISCHARGE NOTE OB - CARE PLAN
Principal Discharge DX:	Vaginal delivery  Goal:	wellness  Assessment and plan of treatment:	Regular diet as tolerated, regular activity as tolerated, nothing per vagina, no heavy lifting

## 2020-05-23 NOTE — PROGRESS NOTE ADULT - SUBJECTIVE AND OBJECTIVE BOX
PPD#1- ATTENDING NOTE    S: Patient doing well. Minimal lochia. Pain controlled.    O: Vital Signs Last 24 Hrs  T(C): 36.6 (23 May 2020 08:10), Max: 37.1 (22 May 2020 15:15)  T(F): 97.8 (23 May 2020 08:10), Max: 98.8 (22 May 2020 15:15)  HR: 69 (23 May 2020 08:10) (64 - 93)  BP: 112/74 (23 May 2020 08:10) (104/67 - 139/72)  BP(mean): --  RR: 18 (23 May 2020 08:10) (17 - 18)  SpO2: 97% (23 May 2020 08:10) (96% - 98%)    Gen: NAD  Abd: soft, NT, ND, fundus firm below umbilicus  Lochia: moderate  Ext: no tenderness, no hyper reflexia,     Labs:                        10.5   10.90 )-----------( 236      ( 22 May 2020 05:45 )             32.4

## 2020-05-23 NOTE — DISCHARGE NOTE OB - MATERIALS PROVIDED
Vaccinations/[facility]  Screening Program/  Immunization Record/Breastfeeding Log/Bottle Feeding Log/Breastfeeding Mother’s Support Group Information/Guide to Postpartum Care/[facility] Hearing Screen Program/Back To Sleep Handout/Shaken Baby Prevention Handout/Breastfeeding Guide and Packet/Birth Certificate Instructions Vaccinations/[facility]  Screening Program/  Immunization Record/Breastfeeding Log/Bottle Feeding Log/Breastfeeding Mother’s Support Group Information/Guide to Postpartum Care/[facility] Hearing Screen Program/Back To Sleep Handout/Shaken Baby Prevention Handout/Breastfeeding Guide and Packet/Birth Certificate Instructions Vaccinations/[facility]  Screening Program/  Immunization Record/Breastfeeding Log/Bottle Feeding Log/Breastfeeding Mother’s Support Group Information/Guide to Postpartum Care/[facility] Hearing Screen Program/Back To Sleep Handout/Shaken Baby Prevention Handout/Breastfeeding Guide and Packet/Birth Certificate Instructions

## 2020-05-23 NOTE — DISCHARGE NOTE OB - PATIENT PORTAL LINK FT
You can access the FollowMyHealth Patient Portal offered by [facility] by registering at the following website: [email or web address]. By joining [employer]’s FollowMyHealth portal, you will also be able to view your health information using other applications (apps) compatible with our system. You can access the FollowMyHealth Patient Portal offered by [facility] by registering at the following website: [email or web address]. By joining [employer]’s FollowMyHealth portal, you will also be able to view your health information using other applications (apps) compatible with our system. You can access the FollowMyHealth Patient Portal offered by [facility] by registering at the following website: [email or web address]. By joining [employer]’s FollowMyHealth portal, you will also be able to view your health information using other applications (apps) compatible with our system.

## 2020-05-23 NOTE — DISCHARGE NOTE OB - PROVIDER TOKENS
PROVIDER:[TOKEN:[[id number]]] PROVIDER:[TOKEN:[[id number]]] PROVIDER:[TOKEN:[[id number]]]

## 2020-05-23 NOTE — DISCHARGE NOTE OB - PLAN OF CARE
wellness Regular diet as tolerated, regular activity as tolerated, nothing per vagina, no heavy lifting

## 2020-05-30 ENCOUNTER — INPATIENT (INPATIENT)
Facility: HOSPITAL | Age: 30
LOS: 0 days | Discharge: HOME | DRG: 776 | End: 2020-05-31
Attending: OBSTETRICS & GYNECOLOGY | Admitting: OBSTETRICS & GYNECOLOGY
Payer: COMMERCIAL

## 2020-05-30 ENCOUNTER — TRANSCRIPTION ENCOUNTER (OUTPATIENT)
Age: 30
End: 2020-05-30

## 2020-05-30 VITALS
TEMPERATURE: 99 F | SYSTOLIC BLOOD PRESSURE: 121 MMHG | DIASTOLIC BLOOD PRESSURE: 86 MMHG | HEART RATE: 98 BPM | RESPIRATION RATE: 18 BRPM | OXYGEN SATURATION: 95 %

## 2020-05-30 LAB
ALBUMIN SERPL ELPH-MCNC: 3.1 G/DL — LOW (ref 3.3–5)
ALP SERPL-CCNC: 140 U/L — HIGH (ref 40–120)
ALT FLD-CCNC: 22 U/L — SIGNIFICANT CHANGE UP (ref 12–78)
ANION GAP SERPL CALC-SCNC: 5 MMOL/L — SIGNIFICANT CHANGE UP (ref 5–17)
APPEARANCE UR: ABNORMAL
AST SERPL-CCNC: 15 U/L — SIGNIFICANT CHANGE UP (ref 15–37)
BASOPHILS # BLD AUTO: 0.09 K/UL — SIGNIFICANT CHANGE UP (ref 0–0.2)
BASOPHILS NFR BLD AUTO: 0.4 % — SIGNIFICANT CHANGE UP (ref 0–2)
BILIRUB SERPL-MCNC: 0.4 MG/DL — SIGNIFICANT CHANGE UP (ref 0.2–1.2)
BILIRUB UR-MCNC: NEGATIVE — SIGNIFICANT CHANGE UP
BUN SERPL-MCNC: 10 MG/DL — SIGNIFICANT CHANGE UP (ref 7–23)
CALCIUM SERPL-MCNC: 8.9 MG/DL — SIGNIFICANT CHANGE UP (ref 8.5–10.1)
CHLORIDE SERPL-SCNC: 112 MMOL/L — HIGH (ref 96–108)
CO2 SERPL-SCNC: 23 MMOL/L — SIGNIFICANT CHANGE UP (ref 22–31)
COLOR SPEC: YELLOW — SIGNIFICANT CHANGE UP
CREAT SERPL-MCNC: 0.52 MG/DL — SIGNIFICANT CHANGE UP (ref 0.5–1.3)
DIFF PNL FLD: ABNORMAL
EOSINOPHIL # BLD AUTO: 0.04 K/UL — SIGNIFICANT CHANGE UP (ref 0–0.5)
EOSINOPHIL NFR BLD AUTO: 0.2 % — SIGNIFICANT CHANGE UP (ref 0–6)
GLUCOSE SERPL-MCNC: 92 MG/DL — SIGNIFICANT CHANGE UP (ref 70–99)
GLUCOSE UR QL: NEGATIVE MG/DL — SIGNIFICANT CHANGE UP
HCT VFR BLD CALC: 36.9 % — SIGNIFICANT CHANGE UP (ref 34.5–45)
HGB BLD-MCNC: 11.7 G/DL — SIGNIFICANT CHANGE UP (ref 11.5–15.5)
IMM GRANULOCYTES NFR BLD AUTO: 1.2 % — SIGNIFICANT CHANGE UP (ref 0–1.5)
KETONES UR-MCNC: NEGATIVE — SIGNIFICANT CHANGE UP
LACTATE SERPL-SCNC: 0.8 MMOL/L — SIGNIFICANT CHANGE UP (ref 0.7–2)
LEUKOCYTE ESTERASE UR-ACNC: ABNORMAL
LYMPHOCYTES # BLD AUTO: 1.41 K/UL — SIGNIFICANT CHANGE UP (ref 1–3.3)
LYMPHOCYTES # BLD AUTO: 6.1 % — LOW (ref 13–44)
MCHC RBC-ENTMCNC: 27.9 PG — SIGNIFICANT CHANGE UP (ref 27–34)
MCHC RBC-ENTMCNC: 31.7 GM/DL — LOW (ref 32–36)
MCV RBC AUTO: 88.1 FL — SIGNIFICANT CHANGE UP (ref 80–100)
MONOCYTES # BLD AUTO: 1.33 K/UL — HIGH (ref 0–0.9)
MONOCYTES NFR BLD AUTO: 5.8 % — SIGNIFICANT CHANGE UP (ref 2–14)
NEUTROPHILS # BLD AUTO: 19.89 K/UL — HIGH (ref 1.8–7.4)
NEUTROPHILS NFR BLD AUTO: 86.3 % — HIGH (ref 43–77)
NITRITE UR-MCNC: NEGATIVE — SIGNIFICANT CHANGE UP
PH UR: 6 — SIGNIFICANT CHANGE UP (ref 5–8)
PLATELET # BLD AUTO: 327 K/UL — SIGNIFICANT CHANGE UP (ref 150–400)
POTASSIUM SERPL-MCNC: 3.6 MMOL/L — SIGNIFICANT CHANGE UP (ref 3.5–5.3)
POTASSIUM SERPL-SCNC: 3.6 MMOL/L — SIGNIFICANT CHANGE UP (ref 3.5–5.3)
PROT SERPL-MCNC: 7 GM/DL — SIGNIFICANT CHANGE UP (ref 6–8.3)
PROT UR-MCNC: 30 MG/DL
RBC # BLD: 4.19 M/UL — SIGNIFICANT CHANGE UP (ref 3.8–5.2)
RBC # FLD: 14.9 % — HIGH (ref 10.3–14.5)
SARS-COV-2 RNA SPEC QL NAA+PROBE: SIGNIFICANT CHANGE UP
SODIUM SERPL-SCNC: 140 MMOL/L — SIGNIFICANT CHANGE UP (ref 135–145)
SP GR SPEC: 1.01 — SIGNIFICANT CHANGE UP (ref 1.01–1.02)
UROBILINOGEN FLD QL: NEGATIVE MG/DL — SIGNIFICANT CHANGE UP
WBC # BLD: 23.04 K/UL — HIGH (ref 3.8–10.5)
WBC # FLD AUTO: 23.04 K/UL — HIGH (ref 3.8–10.5)

## 2020-05-30 PROCEDURE — 36415 COLL VENOUS BLD VENIPUNCTURE: CPT

## 2020-05-30 PROCEDURE — 99221 1ST HOSP IP/OBS SF/LOW 40: CPT

## 2020-05-30 PROCEDURE — 76856 US EXAM PELVIC COMPLETE: CPT | Mod: 26

## 2020-05-30 PROCEDURE — 80053 COMPREHEN METABOLIC PANEL: CPT

## 2020-05-30 PROCEDURE — 85025 COMPLETE CBC W/AUTO DIFF WBC: CPT

## 2020-05-30 RX ORDER — IBUPROFEN 200 MG
600 TABLET ORAL EVERY 6 HOURS
Refills: 0 | Status: DISCONTINUED | OUTPATIENT
Start: 2020-05-30 | End: 2020-05-31

## 2020-05-30 RX ORDER — ACETAMINOPHEN 500 MG
975 TABLET ORAL ONCE
Refills: 0 | Status: COMPLETED | OUTPATIENT
Start: 2020-05-30 | End: 2020-05-30

## 2020-05-30 RX ORDER — LANOLIN
1 OINTMENT (GRAM) TOPICAL EVERY 6 HOURS
Refills: 0 | Status: DISCONTINUED | OUTPATIENT
Start: 2020-05-30 | End: 2020-05-31

## 2020-05-30 RX ORDER — BENZOCAINE 10 %
1 GEL (GRAM) MUCOUS MEMBRANE EVERY 6 HOURS
Refills: 0 | Status: DISCONTINUED | OUTPATIENT
Start: 2020-05-30 | End: 2020-05-31

## 2020-05-30 RX ORDER — ACETAMINOPHEN 500 MG
650 TABLET ORAL EVERY 6 HOURS
Refills: 0 | Status: DISCONTINUED | OUTPATIENT
Start: 2020-05-30 | End: 2020-05-31

## 2020-05-30 RX ORDER — SODIUM CHLORIDE 9 MG/ML
1500 INJECTION, SOLUTION INTRAVENOUS ONCE
Refills: 0 | Status: COMPLETED | OUTPATIENT
Start: 2020-05-30 | End: 2020-05-30

## 2020-05-30 RX ORDER — PIPERACILLIN AND TAZOBACTAM 4; .5 G/20ML; G/20ML
3.38 INJECTION, POWDER, LYOPHILIZED, FOR SOLUTION INTRAVENOUS ONCE
Refills: 0 | Status: COMPLETED | OUTPATIENT
Start: 2020-05-30 | End: 2020-05-30

## 2020-05-30 RX ORDER — PIPERACILLIN AND TAZOBACTAM 4; .5 G/20ML; G/20ML
3.38 INJECTION, POWDER, LYOPHILIZED, FOR SOLUTION INTRAVENOUS EVERY 8 HOURS
Refills: 0 | Status: DISCONTINUED | OUTPATIENT
Start: 2020-05-30 | End: 2020-05-31

## 2020-05-30 RX ORDER — SODIUM CHLORIDE 9 MG/ML
1000 INJECTION INTRAMUSCULAR; INTRAVENOUS; SUBCUTANEOUS ONCE
Refills: 0 | Status: COMPLETED | OUTPATIENT
Start: 2020-05-30 | End: 2020-05-30

## 2020-05-30 RX ADMIN — Medication 1 TABLET(S): at 16:04

## 2020-05-30 RX ADMIN — PIPERACILLIN AND TAZOBACTAM 25 GRAM(S): 4; .5 INJECTION, POWDER, LYOPHILIZED, FOR SOLUTION INTRAVENOUS at 21:24

## 2020-05-30 RX ADMIN — Medication 1 SPRAY(S): at 16:04

## 2020-05-30 RX ADMIN — Medication 1 APPLICATION(S): at 22:00

## 2020-05-30 RX ADMIN — Medication 1 SPRAY(S): at 22:10

## 2020-05-30 RX ADMIN — Medication 1 APPLICATION(S): at 16:05

## 2020-05-30 RX ADMIN — Medication 975 MILLIGRAM(S): at 12:51

## 2020-05-30 RX ADMIN — SODIUM CHLORIDE 1500 MILLILITER(S): 9 INJECTION, SOLUTION INTRAVENOUS at 13:00

## 2020-05-30 RX ADMIN — SODIUM CHLORIDE 1500 MILLILITER(S): 9 INJECTION, SOLUTION INTRAVENOUS at 12:28

## 2020-05-30 RX ADMIN — PIPERACILLIN AND TAZOBACTAM 200 GRAM(S): 4; .5 INJECTION, POWDER, LYOPHILIZED, FOR SOLUTION INTRAVENOUS at 13:05

## 2020-05-30 RX ADMIN — SODIUM CHLORIDE 1000 MILLILITER(S): 9 INJECTION INTRAMUSCULAR; INTRAVENOUS; SUBCUTANEOUS at 13:15

## 2020-05-30 NOTE — ED ADULT NURSE NOTE - CAS EDP DISCH DISPOSITION ADMI
[facility]

## 2020-05-30 NOTE — ED ADULT NURSE NOTE - OBJECTIVE STATEMENT
Pt. to the ED C/O fever with Body Aches since last night. Pt. states she is 1 Week Post Partum ( + Episiotomy) - Pt. denies Cough, SOB, Headache and Urinary Symptoms- Pt. also denies major medical hx. IV, Labs and Swabbed as ordered. Isolation precautions initiated- Will cont to monitor patient closely- Safety maintained

## 2020-05-30 NOTE — H&P ADULT - NSHPREVIEWOFSYSTEMS_GEN_ALL_CORE
CONSTITUTIONAL: Admits fevers, chills, myalgias. No dizziness  RESPIRATORY: No coughs; No shortness of breath  CARDIOVASCULAR: No chest pain or palpitations  GASTROINTESTINAL: Admits RLQ, LLQ abdominal pain. No epigastric pain. No nausea, vomiting, or hematemesis; No diarrhea or constipation. No melena or hematochezia.  GENITOURINARY: No dysuria, frequency or hematuria  OBGYN: Admits increased vaginal bleeding   NEUROLOGICAL: No numbness or weakness  SKIN: No itching, burning, rashes, or lesions     All other review of systems is negative unless indicated above

## 2020-05-30 NOTE — H&P ADULT - HISTORY OF PRESENT ILLNESS
29 y/o female  s/p  8 days ago at [place] presents with chief complaint of fever since early this AM. Pt states she woke up in the middle of the night with Tmax 101.5F and associated chills, myalgias, lower abdominal pain. Pt has been exclusively breast feeding, states breasts are engorged but not red or hot. Denies specific areas of tenderness. Pt s/p episiotomy after  8 days ago, says site is still painful but not worse. Pt admits to increased vaginal bleeding over the past 1-2 days. Was using 1 pad per 4 hours, now using 1 pad per 2 hours with associated cramping.  States pregnancy was without complications. Denies cough, SOB, chest pain, diarrhea.    In ED, VS Tmax 100.0F orally, /86, HR 98, RR 18 95% RA  Labs significant for WBC 23.04 with L. shift  UA: moderate leuks, 26-50 WBC, large blood, 26-50 RBC   Pelvic sono: Large degree of heterogeneous material within the endometrium demonstrating increased vascularity, compatible with a large degree of retained products of conception. 31 y/o female  s/p  8 days ago at [place] presents with chief complaint of fever since early this AM. Pt states she woke up in the middle of the night with Tmax 101.5F and associated chills, myalgias, lower abdominal pain. Pt has been exclusively breast feeding, states breasts are engorged but not red or hot. Denies specific areas of tenderness. Pt s/p episiotomy after  8 days ago, says site is still painful but not worse. Pt admits to increased vaginal bleeding over the past 1-2 days. Was using 1 pad per 4 hours, now using 1 pad per 2 hours with associated cramping.  States pregnancy was without complications. Denies cough, SOB, chest pain, diarrhea.    In ED, VS Tmax 100.0F orally, /86, HR 98, RR 18 95% RA  Labs significant for WBC 23.04 with L. shift  UA: moderate leuks, 26-50 WBC, large blood, 26-50 RBC   Pelvic sono: Large degree of heterogeneous material within the endometrium demonstrating increased vascularity, compatible with a large degree of retained products of conception. 29 y/o female  s/p  8 days ago at [place] presents with chief complaint of fever since early this AM. Pt states she woke up in the middle of the night with Tmax 101.5F and associated chills, myalgias, lower abdominal pain. Pt has been exclusively breast feeding, states breasts are engorged but not red or hot. Denies specific areas of tenderness. Pt s/p episiotomy after  8 days ago, says site is still painful but not worse. Pt admits to increased vaginal bleeding over the past 1-2 days. Was using 1 pad per 4 hours, now using 1 pad per 2 hours with associated cramping.  States pregnancy was without complications. Denies cough, SOB, chest pain, diarrhea.    In ED, VS Tmax 100.0F orally, /86, HR 98, RR 18 95% RA  Labs significant for WBC 23.04 with L. shift  UA: moderate leuks, 26-50 WBC, large blood, 26-50 RBC   Pelvic sono: Large degree of heterogeneous material within the endometrium demonstrating increased vascularity, compatible with a large degree of retained products of conception.

## 2020-05-30 NOTE — CONSULT NOTE ADULT - ASSESSMENT
31 y/o female  s/p  8 days ago at [place] presents with chief complaint of fever since early this AM. Pt states she woke up in the middle of the night with Tmax 101.5F and associated chills, myalgias, lower abdominal pain.     #Fever   - No s/s of mastitis on exam  - Episiotomy site -   - Vaginal vault -   - Repeat 29 y/o female  s/p  8 days ago at [place] presents with chief complaint of fever since early this AM. Pt states she woke up in the middle of the night with Tmax 101.5F and associated chills, myalgias, lower abdominal pain.     #Fever   - No s/s of mastitis on exam  - Episiotomy site -   - Vaginal vault -   - Repeat 29 y/o female  s/p  8 days ago at [place] presents with chief complaint of fever since early this AM. Pt states she woke up in the middle of the night with Tmax 101.5F and associated chills, myalgias, lower abdominal pain.     #Fever   - No s/s of mastitis on exam  - Episiotomy site -   - Vaginal vault -   - Repeat 31 y/o female  s/p  8 days ago at [place] presents with chief complaint of fever since early this AM. Pt states she woke up in the middle of the night with Tmax 101.5F and associated chills, myalgias, lower abdominal pain.     #Fever likely 2/2 endometritis vs perineal infection vs UTI   - In setting leukocytosis with L. shift, increasing temps   - Given leukocytosis of 23.04 and reported Tmax of 101.5F overnight with rising temps in ED, would recommend admission to Gyn and full sepsis workup   - Lactate pending, f/u results  - Pt currently receiving 30cc/kg LR  - Blood and urine cultures sent, f/u results   - Start IV Zosyn  - No s/s of mastitis on exam  - Repeat COVID negative   - F/u AM labs 29 y/o female  s/p  8 days ago at [place] presents with chief complaint of fever since early this AM. Pt states she woke up in the middle of the night with Tmax 101.5F and associated chills, myalgias, lower abdominal pain.     #Fever likely 2/2 endometritis vs perineal infection vs UTI   - In setting leukocytosis with L. shift, increasing temps   - Given leukocytosis of 23.04 and reported Tmax of 101.5F overnight with rising temps in ED, would recommend admission to Gyn and full sepsis workup   - Lactate pending, f/u results  - Pt currently receiving 30cc/kg LR  - Blood and urine cultures sent, f/u results   - Start IV Zosyn  - No s/s of mastitis on exam  - Repeat COVID negative   - F/u AM labs 31 y/o female  s/p  8 days ago at [place] presents with chief complaint of fever since early this AM. Pt states she woke up in the middle of the night with Tmax 101.5F and associated chills, myalgias, lower abdominal pain.     #Fever likely 2/2 endometritis vs perineal infection vs UTI   - In setting leukocytosis with L. shift, increasing temps   - Given leukocytosis of 23.04 and reported Tmax of 101.5F overnight with rising temps in ED, would recommend admission to Gyn and full sepsis workup   - Lactate pending, f/u results  - Pt currently receiving 30cc/kg LR  - Blood and urine cultures sent, f/u results   - Start IV Zosyn  - No s/s of mastitis on exam  - Repeat COVID negative   - F/u AM labs A/P: 1. Postpartum Fever, 2. Endometritis, 3. Possible Perineal wound infection vs UTI     29 y/o female  s/p  8 days ago at [place] presents with chief complaint of fever since early this AM. Pt states she woke up in the middle of the night with Tmax 101.5F and associated chills, myalgias, lower abdominal pain.     - In setting leukocytosis with L. shift, increasing temps   - Given leukocytosis of 23.04 and reported Tmax of 101.5F overnight with rising temps in ED, would recommend admission to Gyn and full sepsis workup   - Lactate pending, f/u results  - Pt currently receiving 30cc/kg LR  - Blood and urine cultures sent, f/u results   - Start IV Zosyn  - No s/s of mastitis on exam  - Repeat COVID negative   - F/u AM labs A/P: 1. Postpartum Fever, 2. Endometritis, 3. Possible Perineal wound infection vs UTI     29 y/o female  s/p  8 days ago at [place] presents with chief complaint of fever since early this AM. Pt states she woke up in the middle of the night with Tmax 101.5F and associated chills, myalgias, lower abdominal pain.     - In setting leukocytosis with L. shift, increasing temps   - Given leukocytosis of 23.04 and reported Tmax of 101.5F overnight with rising temps in ED, would recommend admission to Gyn and full sepsis workup   - Lactate pending, f/u results  - Pt currently receiving 30cc/kg LR  - Blood and urine cultures sent, f/u results   - Start IV Zosyn  - No s/s of mastitis on exam  - Repeat COVID negative   - F/u AM labs A/P: 1. Postpartum Fever, 2. Endometritis, 3. Possible Perineal wound infection vs UTI     31 y/o female  s/p  8 days ago at [place] presents with chief complaint of fever since early this AM. Pt states she woke up in the middle of the night with Tmax 101.5F and associated chills, myalgias, lower abdominal pain.     - In setting leukocytosis with L. shift, increasing temps   - Given leukocytosis of 23.04 and reported Tmax of 101.5F overnight with rising temps in ED, would recommend admission to Gyn and full sepsis workup   - Lactate pending, f/u results  - Pt currently receiving 30cc/kg LR  - Blood and urine cultures sent, f/u results   - Start IV Zosyn  - No s/s of mastitis on exam  - Repeat COVID negative   - F/u AM labs

## 2020-05-30 NOTE — ED ADULT NURSE REASSESSMENT NOTE - NS ED NURSE REASSESS COMMENT FT1
Pt. is resting in bed- Breastfeeding Pump provided and Lunch Menu- No physical distress noted - POC explained to patient- IVFs changed to Normal Saline-- Will cont to monitor patient closely- Safety maintained

## 2020-05-30 NOTE — H&P ADULT - NSHPPHYSICALEXAM_GEN_ALL_CORE
T(C): 37.8 (30 May 2020 12:15), Max: 37.8 (30 May 2020 12:15)  T(F): 100 (30 May 2020 12:15), Max: 100 (30 May 2020 12:15)  HR: 98 (30 May 2020 09:46) (98 - 98)  BP: 121/86 (30 May 2020 09:46) (121/86 - 121/86)  RR: 18 (30 May 2020 09:46) (18 - 18)  SpO2: 95% (30 May 2020 09:46) (95% - 95%)    Constitutional: NAD, awake and alert, diaphoretic   HEENT: NC/AT, EOMI b/l,  MMM  Gastrointestinal: Soft, TTP RLQ, LLQ, nondistended, no guarding, no rebound  OB/Gyn: Abnormally tender on bimanual exam. Appropriate amounts of blood in vaginal vault   Extremities: No peripheral edema  Vascular: 2+ peripheral pulses  Neurological: A/O x 3, no focal deficits  Musculoskeletal: 5/5 strength b/l upper and lower extremities  Skin: No rashes, erythema or warmth b/l breasts

## 2020-05-30 NOTE — ED ADULT NURSE NOTE - NSIMPLEMENTINTERV_GEN_ALL_ED
Implemented All Universal Safety Interventions:  [place] to call system. Call bell, personal items and telephone within reach. Instruct patient to call for assistance. Room bathroom lighting operational. Non-slip footwear when patient is off stretcher. Physically safe environment: no spills, clutter or unnecessary equipment. Stretcher in lowest position, wheels locked, appropriate side rails in place. Implemented All Universal Safety Interventions:  [place] to call system. Call bell, personal items and telephone within reach. Instruct patient to call for assistance. Room bathroom lighting operational. Non-slip footwear when patient is off stretcher. Physically safe environment: no spills, clutter or unnecessary equipment. Stretcher in lowest position, wheels locked, appropriate side rails in place. Implemented All Universal Safety Interventions:  [place] to call system. Call bell, personal items and telephone within reach. Instruct patient to call for assistance. Room bathroom lighting operational. Non-slip footwear when patient is off stretcher. Physically safe environment: no spills, clutter or unnecessary equipment. Stretcher in lowest position, wheels locked, appropriate side rails in place.

## 2020-05-30 NOTE — ED PROVIDER NOTE - PROGRESS NOTE DETAILS
Maged Fenton for attending Dr. Ramirez. Spoke to OB team, will come to bedside to evaluate pt. Maged Fenton for attending Dr. Ramirez. Pelvic us ordered by OB to r/o retained products. Ashley DO: pelvic exam performed by ob/gyn- concern for endometritis; leukocytosis; repeat temp: 100 oral; patient to be admitted for iv antibiotics to ob/gyn team.

## 2020-05-30 NOTE — CONSULT NOTE ADULT - SUBJECTIVE AND OBJECTIVE BOX
BROOKLYN PUENTE  29 y/o [id number]    HPI: 29 y/o female  s/p  8 days ago at [place] presents with chief complaint of fever since early this AM. Pt states she woke up in the middle of the night with Tmax 101.5F and associated chills, myalgias, lower abdominal pain. Pt has been exclusively breast feeding, states breasts are engorged but not red or hot. Denies specific areas of tenderness. Pt s/p episiotomy after  8 days ago, says site is still painful but not worse. Pt admits to increased vaginal bleeding over the past 1-2 days. Was using 1 pad per 4 hours, now using 1 pad per 2 hours with associated cramping.  States pregnancy was without complications. Denies cough, SOB, chest pain, diarrhea.      REVIEW OF SYSTEMS:  CONSTITUTIONAL: Admits fevers, chills, myalgias. No dizziness  RESPIRATORY: No coughs; No shortness of breath  CARDIOVASCULAR: No chest pain or palpitations  GASTROINTESTINAL: Admits RLQ, LLQ abdominal pain. No epigastric pain. No nausea, vomiting, or hematemesis; No diarrhea or constipation. No melena or hematochezia.  GENITOURINARY: No dysuria, frequency or hematuria  OBGYN: Admits increased vaginal bleeding   NEUROLOGICAL: No numbness or weakness  SKIN: No itching, burning, rashes, or lesions     All other review of systems is negative unless indicated above      MEDICATIONS  (STANDING):    MEDICATIONS  (PRN):      Allergies    No Known Allergies    Intolerances        PAST MEDICAL & SURGICAL HISTORY:      OB/GYN HISTORY:        SOCIAL HISTORY: Denies tobacco use     Vital Signs Last 24 Hrs  T(C): 37.4 (30 May 2020 09:46), Max: 37.4 (30 May 2020 09:46)  T(F): 99.3 (30 May 2020 09:46), Max: 99.3 (30 May 2020 09:46)  HR: 98 (30 May 2020 09:46) (98 - 98)  BP: 121/86 (30 May 2020 09:46) (121/86 - 121/86)  BP(mean): --  RR: 18 (30 May 2020 09:46) (18 - 18)  SpO2: 95% (30 May 2020 09:46) (95% - 95%)    PHYSICAL EXAM:  Constitutional: NAD, awake and alert, diaphoretic   HEENT: NC/AT, EOMI b/l,  MMM  Gastrointestinal: Soft, TTP RLQ, LLQ, nondistended, no guarding, no rebound  OB/Gyn:  Extremities: No peripheral edema  Vascular: 2+ peripheral pulses  Neurological: A/O x 3, no focal deficits  Musculoskeletal: 5/5 strength b/l upper and lower extremities  Skin: No rashes, erythema or warmth b/l breasts     LABS:                        11.7   23.04 )-----------( 327      ( 30 May 2020 10:05 )             36.9         140  |  112<H>  |  10  ----------------------------<  92  3.6   |  23  |  0.52    Ca    8.9      30 May 2020 10:05    TPro  7.0  /  Alb  3.1<L>  /  TBili  0.4  /  DBili  x   /  AST  15  /  ALT  22  /  AlkPhos  140<H>      I&O's Detail      Urinalysis Basic - ( 30 May 2020 10:18 )    Color: Yellow / Appearance: Slightly Turbid / S.015 / pH: x  Gluc: x / Ketone: Negative  / Bili: Negative / Urobili: Negative mg/dL   Blood: x / Protein: 30 mg/dL / Nitrite: Negative   Leuk Esterase: Moderate / RBC: 11-25 /HPF / WBC 26-50   Sq Epi: x / Non Sq Epi: Few / Bacteria: Few        RADIOLOGY & ADDITIONAL STUDIES: BROOKLYN PUENTE  31 y/o [id number]    HPI: 31 y/o female  s/p  8 days ago at [place] presents with chief complaint of fever since early this AM. Pt states she woke up in the middle of the night with Tmax 101.5F and associated chills, myalgias, lower abdominal pain. Pt has been exclusively breast feeding, states breasts are engorged but not red or hot. Denies specific areas of tenderness. Pt s/p episiotomy after  8 days ago, says site is still painful but not worse. Pt admits to increased vaginal bleeding over the past 1-2 days. Was using 1 pad per 4 hours, now using 1 pad per 2 hours with associated cramping.  States pregnancy was without complications. Denies cough, SOB, chest pain, diarrhea.      REVIEW OF SYSTEMS:  CONSTITUTIONAL: Admits fevers, chills, myalgias. No dizziness  RESPIRATORY: No coughs; No shortness of breath  CARDIOVASCULAR: No chest pain or palpitations  GASTROINTESTINAL: Admits RLQ, LLQ abdominal pain. No epigastric pain. No nausea, vomiting, or hematemesis; No diarrhea or constipation. No melena or hematochezia.  GENITOURINARY: No dysuria, frequency or hematuria  OBGYN: Admits increased vaginal bleeding   NEUROLOGICAL: No numbness or weakness  SKIN: No itching, burning, rashes, or lesions     All other review of systems is negative unless indicated above      MEDICATIONS  (STANDING):    MEDICATIONS  (PRN):      Allergies    No Known Allergies    Intolerances        PAST MEDICAL & SURGICAL HISTORY:      OB/GYN HISTORY:        SOCIAL HISTORY: Denies tobacco use     Vital Signs Last 24 Hrs  T(C): 37.4 (30 May 2020 09:46), Max: 37.4 (30 May 2020 09:46)  T(F): 99.3 (30 May 2020 09:46), Max: 99.3 (30 May 2020 09:46)  HR: 98 (30 May 2020 09:46) (98 - 98)  BP: 121/86 (30 May 2020 09:46) (121/86 - 121/86)  BP(mean): --  RR: 18 (30 May 2020 09:46) (18 - 18)  SpO2: 95% (30 May 2020 09:46) (95% - 95%)    PHYSICAL EXAM:  Constitutional: NAD, awake and alert, diaphoretic   HEENT: NC/AT, EOMI b/l,  MMM  Gastrointestinal: Soft, TTP RLQ, LLQ, nondistended, no guarding, no rebound  OB/Gyn:  Extremities: No peripheral edema  Vascular: 2+ peripheral pulses  Neurological: A/O x 3, no focal deficits  Musculoskeletal: 5/5 strength b/l upper and lower extremities  Skin: No rashes, erythema or warmth b/l breasts     LABS:                        11.7   23.04 )-----------( 327      ( 30 May 2020 10:05 )             36.9         140  |  112<H>  |  10  ----------------------------<  92  3.6   |  23  |  0.52    Ca    8.9      30 May 2020 10:05    TPro  7.0  /  Alb  3.1<L>  /  TBili  0.4  /  DBili  x   /  AST  15  /  ALT  22  /  AlkPhos  140<H>      I&O's Detail      Urinalysis Basic - ( 30 May 2020 10:18 )    Color: Yellow / Appearance: Slightly Turbid / S.015 / pH: x  Gluc: x / Ketone: Negative  / Bili: Negative / Urobili: Negative mg/dL   Blood: x / Protein: 30 mg/dL / Nitrite: Negative   Leuk Esterase: Moderate / RBC: 11-25 /HPF / WBC 26-50   Sq Epi: x / Non Sq Epi: Few / Bacteria: Few        RADIOLOGY & ADDITIONAL STUDIES: BROOKLYN PUENTE  29 y/o [id number]    HPI: 29 y/o female  s/p  8 days ago at [place] presents with chief complaint of fever since early this AM. Pt states she woke up in the middle of the night with Tmax 101.5F and associated chills, myalgias, lower abdominal pain. Pt has been exclusively breast feeding, states breasts are engorged but not red or hot. Denies specific areas of tenderness. Pt s/p episiotomy after  8 days ago, says site is still painful but not worse. Pt admits to increased vaginal bleeding over the past 1-2 days. Was using 1 pad per 4 hours, now using 1 pad per 2 hours with associated cramping.  States pregnancy was without complications. Denies cough, SOB, chest pain, diarrhea.      REVIEW OF SYSTEMS:  CONSTITUTIONAL: Admits fevers, chills, myalgias. No dizziness  RESPIRATORY: No coughs; No shortness of breath  CARDIOVASCULAR: No chest pain or palpitations  GASTROINTESTINAL: Admits RLQ, LLQ abdominal pain. No epigastric pain. No nausea, vomiting, or hematemesis; No diarrhea or constipation. No melena or hematochezia.  GENITOURINARY: No dysuria, frequency or hematuria  OBGYN: Admits increased vaginal bleeding   NEUROLOGICAL: No numbness or weakness  SKIN: No itching, burning, rashes, or lesions     All other review of systems is negative unless indicated above      MEDICATIONS  (STANDING):    MEDICATIONS  (PRN):      Allergies    No Known Allergies    Intolerances        PAST MEDICAL & SURGICAL HISTORY:      OB/GYN HISTORY:        SOCIAL HISTORY: Denies tobacco use     Vital Signs Last 24 Hrs  T(C): 37.4 (30 May 2020 09:46), Max: 37.4 (30 May 2020 09:46)  T(F): 99.3 (30 May 2020 09:46), Max: 99.3 (30 May 2020 09:46)  HR: 98 (30 May 2020 09:46) (98 - 98)  BP: 121/86 (30 May 2020 09:46) (121/86 - 121/86)  BP(mean): --  RR: 18 (30 May 2020 09:46) (18 - 18)  SpO2: 95% (30 May 2020 09:46) (95% - 95%)    PHYSICAL EXAM:  Constitutional: NAD, awake and alert, diaphoretic   HEENT: NC/AT, EOMI b/l,  MMM  Gastrointestinal: Soft, TTP RLQ, LLQ, nondistended, no guarding, no rebound  OB/Gyn:  Extremities: No peripheral edema  Vascular: 2+ peripheral pulses  Neurological: A/O x 3, no focal deficits  Musculoskeletal: 5/5 strength b/l upper and lower extremities  Skin: No rashes, erythema or warmth b/l breasts     LABS:                        11.7   23.04 )-----------( 327      ( 30 May 2020 10:05 )             36.9         140  |  112<H>  |  10  ----------------------------<  92  3.6   |  23  |  0.52    Ca    8.9      30 May 2020 10:05    TPro  7.0  /  Alb  3.1<L>  /  TBili  0.4  /  DBili  x   /  AST  15  /  ALT  22  /  AlkPhos  140<H>      I&O's Detail      Urinalysis Basic - ( 30 May 2020 10:18 )    Color: Yellow / Appearance: Slightly Turbid / S.015 / pH: x  Gluc: x / Ketone: Negative  / Bili: Negative / Urobili: Negative mg/dL   Blood: x / Protein: 30 mg/dL / Nitrite: Negative   Leuk Esterase: Moderate / RBC: 11-25 /HPF / WBC 26-50   Sq Epi: x / Non Sq Epi: Few / Bacteria: Few        RADIOLOGY & ADDITIONAL STUDIES: BROOKLYN PUENTE  31 y/o [id number]    HPI: 31 y/o female  s/p  8 days ago at [place] presents with chief complaint of fever since early this AM. Pt states she woke up in the middle of the night with Tmax 101.5F and associated chills, myalgias, lower abdominal pain. Pt has been exclusively breast feeding, states breasts are engorged but not red or hot. Denies specific areas of tenderness. Pt s/p episiotomy after  8 days ago, says site is still painful but not worse. Pt admits to increased vaginal bleeding over the past 1-2 days. Was using 1 pad per 4 hours, now using 1 pad per 2 hours with associated cramping.  States pregnancy was without complications. Denies cough, SOB, chest pain, diarrhea.      REVIEW OF SYSTEMS:  CONSTITUTIONAL: Admits fevers, chills, myalgias. No dizziness  RESPIRATORY: No coughs; No shortness of breath  CARDIOVASCULAR: No chest pain or palpitations  GASTROINTESTINAL: Admits RLQ, LLQ abdominal pain. No epigastric pain. No nausea, vomiting, or hematemesis; No diarrhea or constipation. No melena or hematochezia.  GENITOURINARY: No dysuria, frequency or hematuria  OBGYN: Admits increased vaginal bleeding   NEUROLOGICAL: No numbness or weakness  SKIN: No itching, burning, rashes, or lesions     All other review of systems is negative unless indicated above      MEDICATIONS  (STANDING):    MEDICATIONS  (PRN):      Allergies    No Known Allergies    Intolerances        PAST MEDICAL & SURGICAL HISTORY:      OB/GYN HISTORY:        SOCIAL HISTORY: Denies tobacco use     Vital Signs Last 24 Hrs  T(C): 37.4 (30 May 2020 09:46), Max: 37.4 (30 May 2020 09:46)  T(F): 99.3 (30 May 2020 09:46), Max: 99.3 (30 May 2020 09:46)  HR: 98 (30 May 2020 09:46) (98 - 98)  BP: 121/86 (30 May 2020 09:46) (121/86 - 121/86)  BP(mean): --  RR: 18 (30 May 2020 09:46) (18 - 18)  SpO2: 95% (30 May 2020 09:46) (95% - 95%)    PHYSICAL EXAM:  Constitutional: NAD, awake and alert, diaphoretic   HEENT: NC/AT, EOMI b/l,  MMM  Gastrointestinal: Soft, TTP RLQ, LLQ, nondistended, no guarding, no rebound  OB/Gyn: Abnormally tender on bimanual exam. Appropriate amounts of blood in vaginal vault   Extremities: No peripheral edema  Vascular: 2+ peripheral pulses  Neurological: A/O x 3, no focal deficits  Musculoskeletal: 5/5 strength b/l upper and lower extremities  Skin: No rashes, erythema or warmth b/l breasts     LABS:                        11.7   23.04 )-----------( 327      ( 30 May 2020 10:05 )             36.9         140  |  112<H>  |  10  ----------------------------<  92  3.6   |  23  |  0.52    Ca    8.9      30 May 2020 10:05    TPro  7.0  /  Alb  3.1<L>  /  TBili  0.4  /  DBili  x   /  AST  15  /  ALT  22  /  AlkPhos  140<H>      I&O's Detail      Urinalysis Basic - ( 30 May 2020 10:18 )    Color: Yellow / Appearance: Slightly Turbid / S.015 / pH: x  Gluc: x / Ketone: Negative  / Bili: Negative / Urobili: Negative mg/dL   Blood: x / Protein: 30 mg/dL / Nitrite: Negative   Leuk Esterase: Moderate / RBC: 11-25 /HPF / WBC 26-50   Sq Epi: x / Non Sq Epi: Few / Bacteria: Few        RADIOLOGY & ADDITIONAL STUDIES:    < from: US Pelvis Complete (20 @ 12:11) >  Large degree of heterogeneous material within the endometrium demonstrating increased vascularity, compatible with a large degree of retained products of conception.    < end of copied text > BROOKLYN PUENTE  31 y/o [id number]    HPI: 31 y/o female  s/p  8 days ago at [place] presents with chief complaint of fever since early this AM. Pt states she woke up in the middle of the night with Tmax 101.5F and associated chills, myalgias, lower abdominal pain. Pt has been exclusively breast feeding, states breasts are engorged but not red or hot. Denies specific areas of tenderness. Pt s/p episiotomy after  8 days ago, says site is still painful but not worse. Pt admits to increased vaginal bleeding over the past 1-2 days. Was using 1 pad per 4 hours, now using 1 pad per 2 hours with associated cramping.  States pregnancy was without complications. Denies cough, SOB, chest pain, diarrhea.      REVIEW OF SYSTEMS:  CONSTITUTIONAL: Admits fevers, chills, myalgias. No dizziness  RESPIRATORY: No coughs; No shortness of breath  CARDIOVASCULAR: No chest pain or palpitations  GASTROINTESTINAL: Admits RLQ, LLQ abdominal pain. No epigastric pain. No nausea, vomiting, or hematemesis; No diarrhea or constipation. No melena or hematochezia.  GENITOURINARY: No dysuria, frequency or hematuria  OBGYN: Admits increased vaginal bleeding   NEUROLOGICAL: No numbness or weakness  SKIN: No itching, burning, rashes, or lesions     All other review of systems is negative unless indicated above      MEDICATIONS  (STANDING):    MEDICATIONS  (PRN):      Allergies    No Known Allergies    Intolerances        PAST MEDICAL & SURGICAL HISTORY:      OB/GYN HISTORY:        SOCIAL HISTORY: Denies tobacco use     Vital Signs Last 24 Hrs  T(C): 37.4 (30 May 2020 09:46), Max: 37.4 (30 May 2020 09:46)  T(F): 99.3 (30 May 2020 09:46), Max: 99.3 (30 May 2020 09:46)  HR: 98 (30 May 2020 09:46) (98 - 98)  BP: 121/86 (30 May 2020 09:46) (121/86 - 121/86)  BP(mean): --  RR: 18 (30 May 2020 09:46) (18 - 18)  SpO2: 95% (30 May 2020 09:46) (95% - 95%)    PHYSICAL EXAM:  Constitutional: NAD, awake and alert, diaphoretic   HEENT: NC/AT, EOMI b/l,  MMM  Gastrointestinal: Soft, TTP RLQ, LLQ, nondistended, no guarding, no rebound  OB/Gyn: Abnormally tender on bimanual exam. Appropriate amounts of blood in vaginal vault   Extremities: No peripheral edema  Vascular: 2+ peripheral pulses  Neurological: A/O x 3, no focal deficits  Musculoskeletal: 5/5 strength b/l upper and lower extremities  Skin: No rashes, erythema or warmth b/l breasts     LABS:                        11.7   23.04 )-----------( 327      ( 30 May 2020 10:05 )             36.9         140  |  112<H>  |  10  ----------------------------<  92  3.6   |  23  |  0.52    Ca    8.9      30 May 2020 10:05    TPro  7.0  /  Alb  3.1<L>  /  TBili  0.4  /  DBili  x   /  AST  15  /  ALT  22  /  AlkPhos  140<H>      I&O's Detail      Urinalysis Basic - ( 30 May 2020 10:18 )    Color: Yellow / Appearance: Slightly Turbid / S.015 / pH: x  Gluc: x / Ketone: Negative  / Bili: Negative / Urobili: Negative mg/dL   Blood: x / Protein: 30 mg/dL / Nitrite: Negative   Leuk Esterase: Moderate / RBC: 11-25 /HPF / WBC 26-50   Sq Epi: x / Non Sq Epi: Few / Bacteria: Few        RADIOLOGY & ADDITIONAL STUDIES:    < from: US Pelvis Complete (20 @ 12:11) >  Large degree of heterogeneous material within the endometrium demonstrating increased vascularity, compatible with a large degree of retained products of conception.    < end of copied text > BROOKLYN PUENTE  31 y/o [id number]    HPI: 31 y/o female  s/p  8 days ago at [place] presents with chief complaint of fever since early this AM. Pt states she woke up in the middle of the night with Tmax 101.5F and associated chills, myalgias, lower abdominal pain. Pt has been exclusively breast feeding, states breasts are engorged but not red or hot. Denies specific areas of tenderness. Pt s/p episiotomy after  8 days ago, says site is still painful but not worse. Pt admits to increased vaginal bleeding over the past 1-2 days. Was using 1 pad per 4 hours, now using 1 pad per 2 hours with associated cramping.  States pregnancy was without complications. Denies cough, SOB, chest pain, diarrhea.      REVIEW OF SYSTEMS:  CONSTITUTIONAL: Admits fevers, chills, myalgias. No dizziness  RESPIRATORY: No coughs; No shortness of breath  CARDIOVASCULAR: No chest pain or palpitations  GASTROINTESTINAL: Admits RLQ, LLQ abdominal pain. No epigastric pain. No nausea, vomiting, or hematemesis; No diarrhea or constipation. No melena or hematochezia.  GENITOURINARY: No dysuria, frequency or hematuria  OBGYN: Admits increased vaginal bleeding   NEUROLOGICAL: No numbness or weakness  SKIN: No itching, burning, rashes, or lesions     All other review of systems is negative unless indicated above      MEDICATIONS  (STANDING):    MEDICATIONS  (PRN):      Allergies    No Known Allergies    Intolerances        PAST MEDICAL & SURGICAL HISTORY:      OB/GYN HISTORY:        SOCIAL HISTORY: Denies tobacco use     Vital Signs Last 24 Hrs  T(C): 37.4 (30 May 2020 09:46), Max: 37.4 (30 May 2020 09:46)  T(F): 99.3 (30 May 2020 09:46), Max: 99.3 (30 May 2020 09:46)  HR: 98 (30 May 2020 09:46) (98 - 98)  BP: 121/86 (30 May 2020 09:46) (121/86 - 121/86)  BP(mean): --  RR: 18 (30 May 2020 09:46) (18 - 18)  SpO2: 95% (30 May 2020 09:46) (95% - 95%)    PHYSICAL EXAM:  Constitutional: NAD, awake and alert, diaphoretic   HEENT: NC/AT, EOMI b/l,  MMM  Gastrointestinal: Soft, TTP RLQ, LLQ, nondistended, no guarding, no rebound  OB/Gyn: Abnormally tender on bimanual exam. Appropriate amounts of blood in vaginal vault   Extremities: No peripheral edema  Vascular: 2+ peripheral pulses  Neurological: A/O x 3, no focal deficits  Musculoskeletal: 5/5 strength b/l upper and lower extremities  Skin: No rashes, erythema or warmth b/l breasts     LABS:                        11.7   23.04 )-----------( 327      ( 30 May 2020 10:05 )             36.9         140  |  112<H>  |  10  ----------------------------<  92  3.6   |  23  |  0.52    Ca    8.9      30 May 2020 10:05    TPro  7.0  /  Alb  3.1<L>  /  TBili  0.4  /  DBili  x   /  AST  15  /  ALT  22  /  AlkPhos  140<H>      I&O's Detail      Urinalysis Basic - ( 30 May 2020 10:18 )    Color: Yellow / Appearance: Slightly Turbid / S.015 / pH: x  Gluc: x / Ketone: Negative  / Bili: Negative / Urobili: Negative mg/dL   Blood: x / Protein: 30 mg/dL / Nitrite: Negative   Leuk Esterase: Moderate / RBC: 11-25 /HPF / WBC 26-50   Sq Epi: x / Non Sq Epi: Few / Bacteria: Few        RADIOLOGY & ADDITIONAL STUDIES:    < from: US Pelvis Complete (20 @ 12:11) >  Large degree of heterogeneous material within the endometrium demonstrating increased vascularity, compatible with a large degree of retained products of conception.    < end of copied text >

## 2020-05-30 NOTE — H&P ADULT - ASSESSMENT
29 y/o female  s/p  8 days ago at [place] presents with chief complaint of fever since early this AM. Pt states she woke up in the middle of the night with Tmax 101.5F and associated chills, myalgias, lower abdominal pain.     #Fever likely 2/2 endometritis vs perineal infection vs UTI   - In setting leukocytosis with L. shift, increasing temps   - Given leukocytosis of 23.04 and reported Tmax of 101.5F overnight with rising temps in ED, would recommend admission to Gyn and full sepsis workup   - Lactate pending, f/u results. If elevated, repeat s/p IVF   - Pt currently receiving 30cc/kg LR  - Blood and urine cultures sent, f/u results   - Start IV Zosyn  - No s/s of mastitis on exam  - Repeat COVID negative   - F/u AM labs   - Trend temps, Tylenol PRN for T>100.4F    #S/p  PPD 8  - Breastfeeding, pump PRN    #DVT ppx   - SCDs  - Encourage ambulation 31 y/o female  s/p  8 days ago at [place] presents with chief complaint of fever since early this AM. Pt states she woke up in the middle of the night with Tmax 101.5F and associated chills, myalgias, lower abdominal pain.     #Fever likely 2/2 endometritis vs perineal infection vs UTI   - In setting leukocytosis with L. shift, increasing temps   - Given leukocytosis of 23.04 and reported Tmax of 101.5F overnight with rising temps in ED, would recommend admission to Gyn and full sepsis workup   - Lactate pending, f/u results. If elevated, repeat s/p IVF   - Pt currently receiving 30cc/kg LR  - Blood and urine cultures sent, f/u results   - Start IV Zosyn  - No s/s of mastitis on exam  - Repeat COVID negative   - F/u AM labs   - Trend temps, Tylenol PRN for T>100.4F    #S/p  PPD 8  - Breastfeeding, pump PRN    #DVT ppx   - SCDs  - Encourage ambulation 29 y/o female  s/p  8 days ago at [place] presents with chief complaint of fever since early this AM. Pt states she woke up in the middle of the night with Tmax 101.5F and associated chills, myalgias, lower abdominal pain.     #Fever likely 2/2 endometritis vs perineal infection vs UTI   - In setting leukocytosis with L. shift, increasing temps   - Given leukocytosis of 23.04 and reported Tmax of 101.5F overnight with rising temps in ED, would recommend admission to Gyn and full sepsis workup   - Lactate pending, f/u results. If elevated, repeat s/p IVF   - Pt currently receiving 30cc/kg LR  - Blood and urine cultures sent, f/u results   - Start IV Zosyn  - No s/s of mastitis on exam  - Repeat COVID negative   - F/u AM labs   - Trend temps, Tylenol PRN for T>100.4F    #S/p  PPD 8  - Breastfeeding, pump PRN    #DVT ppx   - SCDs  - Encourage ambulation 31 y/o female  s/p  8 days ago at [place] presents with chief complaint of fever since early this AM. Pt states she woke up in the middle of the night with Tmax 101.5F and associated chills, myalgias, lower abdominal pain.     #Fever likely 2/2 endometritis vs perineal infection vs UTI   - In setting leukocytosis with L. shift, increasing temps   - Given leukocytosis of 23.04 and reported Tmax of 101.5F overnight with rising temps in ED, would recommend admission to Gyn and full sepsis workup   - Lactate pending, f/u results. If elevated, repeat s/p IVF   - Pt currently receiving 30cc/kg LR  - Blood and urine cultures sent, f/u results   - Start IV Zosyn q8h  - No s/s of mastitis on exam  - Repeat COVID negative   - F/u AM labs   - Trend temps, Tylenol PRN for T>100.4F    #S/p  PPD 8  - Breastfeeding, pump PRN  - Dermoplast q6h PRN  - Lanolin q6h PRN    #DVT ppx   - SCDs  - Encourage ambulation 29 y/o female  s/p  8 days ago at [place] presents with chief complaint of fever since early this AM. Pt states she woke up in the middle of the night with Tmax 101.5F and associated chills, myalgias, lower abdominal pain.     #Fever likely 2/2 endometritis vs perineal infection vs UTI   - In setting leukocytosis with L. shift, increasing temps   - Given leukocytosis of 23.04 and reported Tmax of 101.5F overnight with rising temps in ED, would recommend admission to Gyn and full sepsis workup   - Lactate pending, f/u results. If elevated, repeat s/p IVF   - Pt currently receiving 30cc/kg LR  - Blood and urine cultures sent, f/u results   - Start IV Zosyn q8h  - No s/s of mastitis on exam  - Repeat COVID negative   - F/u AM labs   - Trend temps, Tylenol PRN for T>100.4F    #S/p  PPD 8  - Breastfeeding, pump PRN  - Dermoplast q6h PRN  - Lanolin q6h PRN    #DVT ppx   - SCDs  - Encourage ambulation 31 y/o female  s/p  8 days ago at [place] presents with chief complaint of fever since early this AM. Pt states she woke up in the middle of the night with Tmax 101.5F and associated chills, myalgias, lower abdominal pain.     #Fever likely 2/2 endometritis vs perineal infection vs UTI   - In setting leukocytosis with L. shift, increasing temps   - Given leukocytosis of 23.04 and reported Tmax of 101.5F overnight with rising temps in ED, would recommend admission to Gyn and full sepsis workup   - Lactate pending, f/u results. If elevated, repeat s/p IVF   - Pt currently receiving 30cc/kg LR  - Blood and urine cultures sent, f/u results   - Start IV Zosyn q8h  - No s/s of mastitis on exam  - Repeat COVID negative   - F/u AM labs   - Trend temps, Tylenol PRN for T>100.4F    #S/p  PPD 8  - Breastfeeding, pump PRN  - Dermoplast q6h PRN  - Lanolin q6h PRN    #DVT ppx   - SCDs  - Encourage ambulation 31 y/o female  s/p  8 days ago at [place] presents with chief complaint of fever since early this AM. Pt states she woke up in the middle of the night with Tmax 101.5F and associated chills, myalgias, lower abdominal pain.     #Fever likely 2/2 endometritis vs perineal infection vs UTI   - In setting leukocytosis with L. shift, increasing temps   - Given leukocytosis of 23.04 and reported Tmax of 101.5F overnight with rising temps in ED, would recommend admission to Gyn and full sepsis workup   - Lactate pending, f/u results. If elevated, repeat s/p IVF   - Pt currently receiving 30cc/kg LR  - Blood and urine cultures sent, f/u results   - Start IV Zosyn q8h  - No s/s of mastitis on exam  - Repeat COVID negative   - F/u AM labs   - Trend temps, Tylenol and Motrin PRN     #S/p  PPD 8  - Breastfeeding, pump PRN  - Dermoplast q6h PRN  - Lanolin q6h PRN    #DVT ppx   - SCDs  - Encourage ambulation 29 y/o female  s/p  8 days ago at [place] presents with chief complaint of fever since early this AM. Pt states she woke up in the middle of the night with Tmax 101.5F and associated chills, myalgias, lower abdominal pain.     #Fever likely 2/2 endometritis vs perineal infection vs UTI   - In setting leukocytosis with L. shift, increasing temps   - Given leukocytosis of 23.04 and reported Tmax of 101.5F overnight with rising temps in ED, would recommend admission to Gyn and full sepsis workup   - Lactate pending, f/u results. If elevated, repeat s/p IVF   - Pt currently receiving 30cc/kg LR  - Blood and urine cultures sent, f/u results   - Start IV Zosyn q8h  - No s/s of mastitis on exam  - Repeat COVID negative   - F/u AM labs   - Trend temps, Tylenol and Motrin PRN     #S/p  PPD 8  - Breastfeeding, pump PRN  - Dermoplast q6h PRN  - Lanolin q6h PRN    #DVT ppx   - SCDs  - Encourage ambulation 29 y/o female  s/p  8 days ago at [place] presents with chief complaint of fever since early this AM. Pt states she woke up in the middle of the night with Tmax 101.5F and associated chills, myalgias, lower abdominal pain.     #Fever likely 2/2 endometritis vs perineal infection vs UTI   - In setting leukocytosis with L. shift, increasing temps   - Given leukocytosis of 23.04 and reported Tmax of 101.5F overnight with rising temps in ED, would recommend admission to Gyn and full sepsis workup   - Lactate pending, f/u results. If elevated, repeat s/p IVF   - Pt currently receiving 30cc/kg LR  - Blood and urine cultures sent, f/u results   - Start IV Zosyn q8h  - No s/s of mastitis on exam  - Repeat COVID negative   - F/u AM labs   - Trend temps, Tylenol and Motrin PRN     #S/p  PPD 8  - Breastfeeding, pump PRN  - Dermoplast q6h PRN  - Lanolin q6h PRN    #DVT ppx   - SCDs  - Encourage ambulation A/P: 1. Postpartum Fever, 2. Endometritis, 3. Possible Perineal wound infection vs UTI     29 y/o female  s/p  8 days ago at [place] presents with chief complaint of fever since early this AM. Pt states she woke up in the middle of the night with Tmax 101.5F and associated chills, myalgias, lower abdominal pain.     #Fever likely 2/2 endometritis vs perineal infection vs UTI   - In setting leukocytosis with L. shift, increasing temps   - Given leukocytosis of 23.04 and reported Tmax of 101.5F overnight with rising temps in ED, would recommend admission to Gyn and full sepsis workup   - Lactate pending, f/u results. If elevated, repeat s/p IVF   - Pt currently receiving 30cc/kg LR  - Blood and urine cultures sent, f/u results   - Start IV Zosyn q8h  - No s/s of mastitis on exam  - Repeat COVID negative   - F/u AM labs   - Trend temps, Tylenol and Motrin PRN     #S/p  PPD 8  - Breastfeeding, pump PRN  - Dermoplast q6h PRN  - Lanolin q6h PRN    #DVT ppx   - SCDs  - Encourage ambulation A/P: 1. Postpartum Fever, 2. Endometritis, 3. Possible Perineal wound infection vs UTI     31 y/o female  s/p  8 days ago at [place] presents with chief complaint of fever since early this AM. Pt states she woke up in the middle of the night with Tmax 101.5F and associated chills, myalgias, lower abdominal pain.     #Fever likely 2/2 endometritis vs perineal infection vs UTI   - In setting leukocytosis with L. shift, increasing temps   - Given leukocytosis of 23.04 and reported Tmax of 101.5F overnight with rising temps in ED, would recommend admission to Gyn and full sepsis workup   - Lactate pending, f/u results. If elevated, repeat s/p IVF   - Pt currently receiving 30cc/kg LR  - Blood and urine cultures sent, f/u results   - Start IV Zosyn q8h  - No s/s of mastitis on exam  - Repeat COVID negative   - F/u AM labs   - Trend temps, Tylenol and Motrin PRN     #S/p  PPD 8  - Breastfeeding, pump PRN  - Dermoplast q6h PRN  - Lanolin q6h PRN    #DVT ppx   - SCDs  - Encourage ambulation A/P: 1. Postpartum Fever, 2. Endometritis, 3. Possible Perineal wound infection vs UTI     31 y/o female  s/p  8 days ago at [place] presents with chief complaint of fever since early this AM. Pt states she woke up in the middle of the night with Tmax 101.5F and associated chills, myalgias, lower abdominal pain.     #Fever likely 2/2 endometritis vs perineal infection vs UTI   - In setting leukocytosis with L. shift, increasing temps   - Given leukocytosis of 23.04 and reported Tmax of 101.5F overnight with rising temps in ED, would recommend admission to Gyn and full sepsis workup   - Lactate pending, f/u results. If elevated, repeat s/p IVF   - Pt currently receiving 30cc/kg LR  - Blood and urine cultures sent, f/u results   - Start IV Zosyn q8h  - No s/s of mastitis on exam  - Repeat COVID negative   - F/u AM labs   - Trend temps, Tylenol and Motrin PRN     #S/p  PPD 8  - Breastfeeding, pump PRN  - Dermoplast q6h PRN  - Lanolin q6h PRN    #DVT ppx   - SCDs  - Encourage ambulation

## 2020-05-30 NOTE — ED PROVIDER NOTE - OBJECTIVE STATEMENT
29 y/o female who is  with a PMHx of episiotomy presents to the ED s/p  x8 days. Last night, she had a Tmax of 101.5. Pt took Tylenol at 4:30 this morning. Pt also reports body aches, mild lower abd pain, and vaginal pain. Pt denies cough, SOB, and CP. Pt is breast feeding, but denies any redness or warmth to bilateral breasts. 31 y/o female who is  with a PMHx of episiotomy presents to the ED s/p  x8 days. Last night, she had a Tmax of 101.5. Pt took Tylenol at 4:30 this morning. Pt also reports body aches, mild lower abd pain, and vaginal pain. Pt denies cough, SOB, and CP. Pt is breast feeding, but denies any redness or warmth to bilateral breasts.

## 2020-05-30 NOTE — H&P ADULT - ATTENDING COMMENTS
Gyn Attending on call: MD1 Hospitalist    Patient was seen and examined with the resident. H&P as above per resident and below, reviewed, edited and added as needed and agree with above.    31 yo  s/p  8 days ago presented for fever with Tm of >101 at home. In ED her Tm was 99.4 then 100.0. Positive Lochia appropriate on exam.     SSE: Episiotomy (ML) healing and tender  SVE: Normal postpartum vaginal bleeding, Cervix closed, No CMT, AV uterus about 12-13 weeks sized, TENDER to palpation, normal adnexa    A/P: 1. Postpartum Fever suspect due to 2. Endometritis, 3. Possible Wound infection (Perineal), 4. R/O UTI, 5. Leukocytosis with left shift    Will admit for IV Abx, Zosyn  Repeat Labs in am  See full admission order   Breast pump PRN  Suspect U/S findings C/W postpartum changes Gyn Attending on call: MD1 Hospitalist    Patient was seen and examined with the resident. H&P as above per resident and below, reviewed, edited and added as needed and agree with above.    29 yo  s/p  8 days ago presented for fever with Tm of >101 at home. In ED her Tm was 99.4 then 100.0. Positive Lochia appropriate on exam.     SSE: Episiotomy (ML) healing and tender  SVE: Normal postpartum vaginal bleeding, Cervix closed, No CMT, AV uterus about 12-13 weeks sized, TENDER to palpation, normal adnexa    A/P: 1. Postpartum Fever suspect due to 2. Endometritis, 3. Possible Wound infection (Perineal), 4. R/O UTI, 5. Leukocytosis with left shift    Will admit for IV Abx, Zosyn  Repeat Labs in am  See full admission order   Breast pump PRN  Suspect U/S findings C/W postpartum changes

## 2020-05-30 NOTE — ED ADULT TRIAGE NOTE - CHIEF COMPLAINT QUOTE
pt c/o bodya ches and fever since yesterday, with episiotomy pain and bleeding, pt is postpartum 1 week.

## 2020-05-30 NOTE — ED PROVIDER NOTE - CLINICAL SUMMARY MEDICAL DECISION MAKING FREE TEXT BOX
31 y/o female with a fever. Plan - labs, COVID swab, UA, OB consult. 29 y/o female with a fever. Plan - labs, COVID swab, UA, OB consult. 29 y/o female with fever. Plan - labs, COVID swab, UA, OB consult. 31 y/o female with fever. Plan - labs, COVID swab, UA, OB consult.

## 2020-05-31 ENCOUNTER — TRANSCRIPTION ENCOUNTER (OUTPATIENT)
Age: 30
End: 2020-05-31

## 2020-05-31 VITALS
SYSTOLIC BLOOD PRESSURE: 120 MMHG | TEMPERATURE: 97 F | DIASTOLIC BLOOD PRESSURE: 68 MMHG | HEART RATE: 61 BPM | RESPIRATION RATE: 16 BRPM | OXYGEN SATURATION: 98 %

## 2020-05-31 LAB
ALBUMIN SERPL ELPH-MCNC: 2.4 G/DL — LOW (ref 3.3–5)
ALP SERPL-CCNC: 114 U/L — SIGNIFICANT CHANGE UP (ref 40–120)
ALT FLD-CCNC: 20 U/L — SIGNIFICANT CHANGE UP (ref 12–78)
ANION GAP SERPL CALC-SCNC: 5 MMOL/L — SIGNIFICANT CHANGE UP (ref 5–17)
AST SERPL-CCNC: 19 U/L — SIGNIFICANT CHANGE UP (ref 15–37)
BASOPHILS # BLD AUTO: 0.06 K/UL — SIGNIFICANT CHANGE UP (ref 0–0.2)
BASOPHILS NFR BLD AUTO: 0.5 % — SIGNIFICANT CHANGE UP (ref 0–2)
BILIRUB SERPL-MCNC: 0.3 MG/DL — SIGNIFICANT CHANGE UP (ref 0.2–1.2)
BUN SERPL-MCNC: 7 MG/DL — SIGNIFICANT CHANGE UP (ref 7–23)
CALCIUM SERPL-MCNC: 8.5 MG/DL — SIGNIFICANT CHANGE UP (ref 8.5–10.1)
CHLORIDE SERPL-SCNC: 112 MMOL/L — HIGH (ref 96–108)
CO2 SERPL-SCNC: 23 MMOL/L — SIGNIFICANT CHANGE UP (ref 22–31)
CREAT SERPL-MCNC: 0.52 MG/DL — SIGNIFICANT CHANGE UP (ref 0.5–1.3)
CULTURE RESULTS: SIGNIFICANT CHANGE UP
EOSINOPHIL # BLD AUTO: 0.12 K/UL — SIGNIFICANT CHANGE UP (ref 0–0.5)
EOSINOPHIL NFR BLD AUTO: 1.1 % — SIGNIFICANT CHANGE UP (ref 0–6)
GLUCOSE SERPL-MCNC: 89 MG/DL — SIGNIFICANT CHANGE UP (ref 70–99)
HCT VFR BLD CALC: 34 % — LOW (ref 34.5–45)
HGB BLD-MCNC: 10.9 G/DL — LOW (ref 11.5–15.5)
IMM GRANULOCYTES NFR BLD AUTO: 2.2 % — HIGH (ref 0–1.5)
LYMPHOCYTES # BLD AUTO: 18.9 % — SIGNIFICANT CHANGE UP (ref 13–44)
LYMPHOCYTES # BLD AUTO: 2.11 K/UL — SIGNIFICANT CHANGE UP (ref 1–3.3)
MCHC RBC-ENTMCNC: 28.1 PG — SIGNIFICANT CHANGE UP (ref 27–34)
MCHC RBC-ENTMCNC: 32.1 GM/DL — SIGNIFICANT CHANGE UP (ref 32–36)
MCV RBC AUTO: 87.6 FL — SIGNIFICANT CHANGE UP (ref 80–100)
MONOCYTES # BLD AUTO: 0.88 K/UL — SIGNIFICANT CHANGE UP (ref 0–0.9)
MONOCYTES NFR BLD AUTO: 7.9 % — SIGNIFICANT CHANGE UP (ref 2–14)
NEUTROPHILS # BLD AUTO: 7.74 K/UL — HIGH (ref 1.8–7.4)
NEUTROPHILS NFR BLD AUTO: 69.4 % — SIGNIFICANT CHANGE UP (ref 43–77)
PLATELET # BLD AUTO: 291 K/UL — SIGNIFICANT CHANGE UP (ref 150–400)
POTASSIUM SERPL-MCNC: 3.6 MMOL/L — SIGNIFICANT CHANGE UP (ref 3.5–5.3)
POTASSIUM SERPL-SCNC: 3.6 MMOL/L — SIGNIFICANT CHANGE UP (ref 3.5–5.3)
PROT SERPL-MCNC: 5.9 GM/DL — LOW (ref 6–8.3)
RBC # BLD: 3.88 M/UL — SIGNIFICANT CHANGE UP (ref 3.8–5.2)
RBC # FLD: 15.3 % — HIGH (ref 10.3–14.5)
SODIUM SERPL-SCNC: 140 MMOL/L — SIGNIFICANT CHANGE UP (ref 135–145)
SPECIMEN SOURCE: SIGNIFICANT CHANGE UP
WBC # BLD: 11.15 K/UL — HIGH (ref 3.8–10.5)
WBC # FLD AUTO: 11.15 K/UL — HIGH (ref 3.8–10.5)

## 2020-05-31 RX ORDER — CEPHALEXIN 500 MG
1 CAPSULE ORAL
Qty: 28 | Refills: 0
Start: 2020-05-31 | End: 2020-06-06

## 2020-05-31 RX ORDER — LANOLIN
1 OINTMENT (GRAM) TOPICAL
Qty: 0 | Refills: 0 | DISCHARGE
Start: 2020-05-31

## 2020-05-31 RX ADMIN — Medication 1 TABLET(S): at 11:51

## 2020-05-31 RX ADMIN — Medication 1 SPRAY(S): at 04:15

## 2020-05-31 RX ADMIN — PIPERACILLIN AND TAZOBACTAM 25 GRAM(S): 4; .5 INJECTION, POWDER, LYOPHILIZED, FOR SOLUTION INTRAVENOUS at 05:59

## 2020-05-31 NOTE — DISCHARGE NOTE OB - PATIENT PORTAL LINK FT
You can access the FollowMyHealth Patient Portal offered by [facility] by registering at the following website: [email or web address]. By joining [employer]’s FollowMyHealth portal, you will also be able to view your health information using other applications (apps) compatible with our system. You can access the FollowMyHealth Patient Portal offered by [facility] by registering at the following website: [email or web address]. By joining [employer]’s FollowMyHealth portal, you will also be able to view your health information using other applications (apps) compatible with our system. You can access the FollowMyHealth Patient Portal offered by [facility] by registering at the following website: [email or web address]. By joining [employer]’s FollowMyHealth portal, you will also be able to view your health information using other applications (apps) compatible with our system.

## 2020-05-31 NOTE — DISCHARGE NOTE NURSING/CASE MANAGEMENT/SOCIAL WORK - PATIENT PORTAL LINK FT
You can access the FollowMyHealth Patient Portal offered by [facility] by registering at the following website: [email or web address]. By joining [employer]’s FollowMyHealth portal, you will also be able to view your health information using other applications (apps) compatible with our system. You can access the FollowMyHealth Patient Portal offered by [facility] by registering at the following website: [email or web address]. By joining [employer]’s FollowMyHealth portal, you will also be able to view your health information using other applications (apps) compatible with our system. You can access the FollowMyHealth Patient Portal offered by [facility] by registering at the following website: [email or web address]. By joining [employer]’s FollowMyHealth portal, you will also be able to view your health information using other applications (apps) compatible with our system.

## 2020-05-31 NOTE — DISCHARGE NOTE OB - HOSPITAL COURSE
P2 s/p  20 presented yesterday, 20 c/o fever T 102, chills and myalgia. She was admitted for postpartum endometritis. She received IV zosyn for 24 hrs. She was discharge home on po keflex.

## 2020-05-31 NOTE — DISCHARGE NOTE OB - MEDICATION SUMMARY - MEDICATIONS TO TAKE
I will START or STAY ON the medications listed below when I get home from the hospital:    acetaminophen 325 mg oral tablet  -- 3 tab(s) by mouth every 6 hours  -- Indication: For mild pain    ibuprofen 600 mg oral tablet  -- 1 tab(s) by mouth every 6 hours  -- Indication: For moderate pain    Keflex 500 mg oral capsule  -- 1 cap(s) by mouth every 6 hours   -- Finish all this medication unless otherwise directed by prescriber.    -- Indication: For infection    lanolin topical cream  -- 1 application on skin every 6 hours, As needed, Breast soreness  -- Indication: For breast tenderness    Prenatal Multivitamins with Folic Acid 1 mg oral tablet  -- 1 tab(s) by mouth once a day  -- Indication: For vitamin supplementation

## 2020-05-31 NOTE — PROGRESS NOTE ADULT - SUBJECTIVE AND OBJECTIVE BOX
Progress Note  Pt denies pain  ICU Vital Signs Last 24 Hrs  T(C): 36.4 (31 May 2020 05:40), Max: 37.8 (30 May 2020 12:15)  T(F): 97.6 (31 May 2020 05:40), Max: 100 (30 May 2020 12:15)  HR: 70 (31 May 2020 05:40) (70 - 96)  BP: 102/61 (31 May 2020 05:40) (102/61 - 126/73)  BP(mean): 74 (30 May 2020 17:02) (74 - 74)  ABP: --  ABP(mean): --  RR: 18 (31 May 2020 05:40) (16 - 18)  SpO2: 97% (31 May 2020 05:40) (97% - 100%)  Lungs CTA  CV RRR  abdomen soft, ND/NT   scant lochia. episiotomy intact. no CMT. utx 10 wk size, very mild tenderness. no adnexal tenderness. no rebound/guarding  extremities -E/E    Labs                        10.9   11.15 )-----------( 291      ( 31 May 2020 07:35 )             34.0     A/P s/p  20 with sudden onset of T 102 yesterday @ 4 a.m., chills and myalgia. SARS-CoV-19 negative. WBC 23 on admission. Tmax 100 since hospitalization. Presently afebrile on zosyn. WBC is now down to 11.15. Presentation c/w endometritis, although rare post . The patient is stable, and strongly desires discharge. Will discharge home after twenty-four hours of IV antibiotics, if remains afebrile.

## 2020-05-31 NOTE — DISCHARGE NOTE OB - CARE PROVIDER_API CALL
Clarissa rendon  [address] [place] [address]  Phone: [phone]  Fax: (   )    -  Established Patient  Follow Up Time: Clarissa rendon  [address] [place] [address]  Phone: [phone]  Fax: (   )    -  Established Patient  Follow Up Time: Clarissa rendon  [address] [place] [address]  Phone: [phone]  Fax: (   )    -  Established Patient  Follow Up Time:

## 2020-05-31 NOTE — DISCHARGE NOTE OB - CARE PLAN
Principal Discharge DX:	Endometritis following delivery  Goal:	return to post pregnancy physical status  Assessment and plan of treatment:	s/p  complicated by endometritis. return to post pregnancy status

## 2020-05-31 NOTE — DISCHARGE NOTE OB - PROVIDER TOKENS
FREE:[LAST:[justice],FIRST:[Clarissa],PHONE:[phone]],FAX:[(   )    -],ADDRESS:[[address] [place] [address]],ESTABLISHEDPATIENT:[T]] FREE:[LAST:[justice],FIRST:[Clarissa],PHONE:[phone]],FAX:[(   )    -],ADDRESS:[[address] [place] [address]],ESTABLISHEDPATIENT:[T]] FREE:[LAST:[justice],FIRST:[Clarissa],PHONE:[phone]],FAX:[(   )    -],ADDRESS:[[address] [place] [address]],ESTABLISHEDPATIENT:[T]]

## 2020-05-31 NOTE — DISCHARGE NOTE OB - PLAN OF CARE
return to post pregnancy physical status s/p  complicated by endometritis. return to post pregnancy status

## 2020-06-02 DIAGNOSIS — Z11.59 ENCOUNTER FOR SCREENING FOR OTHER VIRAL DISEASES: ICD-10-CM

## 2020-06-04 LAB
CULTURE RESULTS: SIGNIFICANT CHANGE UP
SPECIMEN SOURCE: SIGNIFICANT CHANGE UP

## 2020-07-02 ENCOUNTER — RESULT REVIEW (OUTPATIENT)
Age: 30
End: 2020-07-02

## 2020-12-01 ENCOUNTER — TRANSCRIPTION ENCOUNTER (OUTPATIENT)
Age: 30
End: 2020-12-01

## 2020-12-01 NOTE — DISCHARGE NOTE OB - IF BREASTFEEDING, ADD A TOTAL OF 500 EXTRA CALORIES EACH DAY
Called patient to let her know to  Acthar from [employer] in [facility]. Patient is still having balance issues. Stated she fell outside yesterday. She will try the 3 days of Acthar and call back if not better.
Patient called stating within the past couple of days she is unable to keep her balance and has been walking with a cane. She would like to know if there is something you can do for her. Thank you.
Statement Selected

## 2020-12-09 ENCOUNTER — OUTPATIENT (OUTPATIENT)
Dept: OUTPATIENT SERVICES | Facility: HOSPITAL | Age: 30
LOS: 1 days | End: 2020-12-09
Payer: COMMERCIAL

## 2020-12-09 ENCOUNTER — APPOINTMENT (OUTPATIENT)
Dept: ULTRASOUND IMAGING | Facility: CLINIC | Age: 30
End: 2020-12-09
Payer: COMMERCIAL

## 2020-12-09 DIAGNOSIS — N63.0 UNSPECIFIED LUMP IN UNSPECIFIED BREAST: ICD-10-CM

## 2020-12-09 PROCEDURE — 76641 ULTRASOUND BREAST COMPLETE: CPT | Mod: 26,50

## 2021-01-11 PROBLEM — N63.0 BREAST MASS IN FEMALE: Status: ACTIVE | Noted: 2021-01-11

## 2021-01-13 ENCOUNTER — APPOINTMENT (OUTPATIENT)
Dept: SURGICAL ONCOLOGY | Facility: CLINIC | Age: 31
End: 2021-01-13
Payer: COMMERCIAL

## 2021-01-13 VITALS
WEIGHT: 115 LBS | BODY MASS INDEX: 19.16 KG/M2 | OXYGEN SATURATION: 98 % | HEIGHT: 65 IN | RESPIRATION RATE: 16 BRPM | HEART RATE: 81 BPM | SYSTOLIC BLOOD PRESSURE: 115 MMHG | DIASTOLIC BLOOD PRESSURE: 72 MMHG

## 2021-01-13 DIAGNOSIS — G43.909 MIGRAINE, UNSPECIFIED, NOT INTRACTABLE, W/OUT STATUS MIGRAINOSUS: ICD-10-CM

## 2021-01-13 DIAGNOSIS — N63.10 UNSPECIFIED LUMP IN THE RIGHT BREAST, UNSPECIFIED QUADRANT: ICD-10-CM

## 2021-01-13 DIAGNOSIS — Z78.9 OTHER SPECIFIED HEALTH STATUS: ICD-10-CM

## 2021-01-13 DIAGNOSIS — N63.0 UNSPECIFIED LUMP IN UNSPECIFIED BREAST: ICD-10-CM

## 2021-01-13 DIAGNOSIS — Z86.59 PERSONAL HISTORY OF OTHER MENTAL AND BEHAVIORAL DISORDERS: ICD-10-CM

## 2021-01-13 DIAGNOSIS — Z86.79 PERSONAL HISTORY OF OTHER DISEASES OF THE CIRCULATORY SYSTEM: ICD-10-CM

## 2021-01-13 PROCEDURE — 99072 ADDL SUPL MATRL&STAF TM PHE: CPT

## 2021-01-13 PROCEDURE — 99244 OFF/OP CNSLTJ NEW/EST MOD 40: CPT

## 2021-01-13 NOTE — ASSESSMENT
[FreeTextEntry1] : Right breast lump likely benign fibroglandular tissue\par BI-RADS 1 ultrasound last month\par Reassured patient index of suspicion for malignancy is low\par RTO as needed

## 2021-01-13 NOTE — CONSULT LETTER
[Dear  ___] : Dear  [unfilled], [Consult Letter:] : I had the pleasure of evaluating your patient, [unfilled]. [Please see my note below.] : Please see my note below. [Sincerely,] : Sincerely, [FreeTextEntry3] : Sebas Carney MD FACS

## 2021-01-13 NOTE — PHYSICAL EXAM
[Normal] : supple, no neck mass and thyroid not enlarged [Normal Neck Lymph Nodes] : normal neck lymph nodes  [Normal Supraclavicular Lymph Nodes] : normal supraclavicular lymph nodes [Normal Groin Lymph Nodes] : normal groin lymph nodes [Normal Axillary Lymph Nodes] : normal axillary lymph nodes [Normal] : oriented to person, place and time, with appropriate affect [de-identified] : 6 mm area of nodularity right upper inner quadrant, ultrasound shows normal lactating fibroglandular tissue, no solid or cystic lesions, no dominant masses or axillary adenopathy bilaterally

## 2021-01-13 NOTE — HISTORY OF PRESENT ILLNESS
[de-identified] : Ms. BROOKLYN PUENTE  is a 30 year  old female with no significant PMH  presenting for an initial consultation for evaluation of palpable abnormality in the right breast.   Patient is ~8 months postpartum ( 2020) and is currently lactating. \par She had a painful palpable lump and erythema right upper inner quadrant last month not treated with antibiotics.  The erythema and pain resolved however she continues to have a small palpable nodule right upper inner quadrant.\par \par B/L Baseline Breast Ultrasound 2020:   RIGHT BREAST: No suspicious solid mass.  No focal lesion is identified at the site of patient concern at the 12:00 axis, 4 cm from the nipple, as designated by the patient. Heterogeneous parenchyma is noted without focal mass lesion. No suspicious findings in the LEFT breast.  No evidence of malignancy. 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

## 2021-07-06 ENCOUNTER — TRANSCRIPTION ENCOUNTER (OUTPATIENT)
Age: 31
End: 2021-07-06

## 2021-07-14 ENCOUNTER — APPOINTMENT (OUTPATIENT)
Dept: RADIOLOGY | Facility: CLINIC | Age: 31
End: 2021-07-14
Payer: COMMERCIAL

## 2021-07-14 ENCOUNTER — OUTPATIENT (OUTPATIENT)
Dept: OUTPATIENT SERVICES | Facility: HOSPITAL | Age: 31
LOS: 1 days | End: 2021-07-14
Payer: COMMERCIAL

## 2021-07-14 DIAGNOSIS — R05 COUGH: ICD-10-CM

## 2021-07-14 PROCEDURE — 71046 X-RAY EXAM CHEST 2 VIEWS: CPT | Mod: 26

## 2021-09-21 ENCOUNTER — APPOINTMENT (OUTPATIENT)
Dept: MAMMOGRAPHY | Facility: CLINIC | Age: 31
End: 2021-09-21
Payer: COMMERCIAL

## 2021-09-21 ENCOUNTER — APPOINTMENT (OUTPATIENT)
Dept: ULTRASOUND IMAGING | Facility: CLINIC | Age: 31
End: 2021-09-21
Payer: COMMERCIAL

## 2021-09-21 ENCOUNTER — OUTPATIENT (OUTPATIENT)
Dept: OUTPATIENT SERVICES | Facility: HOSPITAL | Age: 31
LOS: 1 days | End: 2021-09-21
Payer: COMMERCIAL

## 2021-09-21 DIAGNOSIS — N64.52 NIPPLE DISCHARGE: ICD-10-CM

## 2021-09-21 PROCEDURE — 77066 DX MAMMO INCL CAD BI: CPT | Mod: 26

## 2021-09-21 PROCEDURE — G0279: CPT | Mod: 26

## 2021-09-21 PROCEDURE — 76641 ULTRASOUND BREAST COMPLETE: CPT | Mod: 26,50

## 2021-10-28 ENCOUNTER — TRANSCRIPTION ENCOUNTER (OUTPATIENT)
Age: 31
End: 2021-10-28

## 2021-12-06 PROBLEM — N64.52 NIPPLE DISCHARGE: Status: ACTIVE | Noted: 2021-12-06

## 2021-12-07 ENCOUNTER — APPOINTMENT (OUTPATIENT)
Dept: SURGICAL ONCOLOGY | Facility: CLINIC | Age: 31
End: 2021-12-07
Payer: COMMERCIAL

## 2021-12-07 VITALS
DIASTOLIC BLOOD PRESSURE: 72 MMHG | HEIGHT: 65 IN | WEIGHT: 110 LBS | HEART RATE: 81 BPM | BODY MASS INDEX: 18.33 KG/M2 | SYSTOLIC BLOOD PRESSURE: 104 MMHG

## 2021-12-07 VITALS — TEMPERATURE: 96.6 F

## 2021-12-07 DIAGNOSIS — F41.9 ANXIETY DISORDER, UNSPECIFIED: ICD-10-CM

## 2021-12-07 DIAGNOSIS — D64.9 ANEMIA, UNSPECIFIED: ICD-10-CM

## 2021-12-07 DIAGNOSIS — F32.A DEPRESSION, UNSPECIFIED: ICD-10-CM

## 2021-12-07 DIAGNOSIS — N64.52 NIPPLE DISCHARGE: ICD-10-CM

## 2021-12-07 DIAGNOSIS — Z78.9 OTHER SPECIFIED HEALTH STATUS: ICD-10-CM

## 2021-12-07 DIAGNOSIS — Z80.0 FAMILY HISTORY OF MALIGNANT NEOPLASM OF DIGESTIVE ORGANS: ICD-10-CM

## 2021-12-07 DIAGNOSIS — Z80.42 FAMILY HISTORY OF MALIGNANT NEOPLASM OF PROSTATE: ICD-10-CM

## 2021-12-07 DIAGNOSIS — K90.0 CELIAC DISEASE: ICD-10-CM

## 2021-12-07 PROCEDURE — 99243 OFF/OP CNSLTJ NEW/EST LOW 30: CPT

## 2021-12-07 RX ORDER — SERTRALINE HYDROCHLORIDE 25 MG/1
TABLET, FILM COATED ORAL
Refills: 0 | Status: ACTIVE | COMMUNITY

## 2021-12-07 NOTE — ASSESSMENT
[FreeTextEntry1] : The patient is a 31 year old female with nipple discharge.\par \par We discussed that nipple discharge is common among women of reproductive age. Physiologic discharge is usually bilateral, multiductal, nonbloody, and associated with nipple stimulation or breast compression. During pregnancy and lactation, the mammary glands excrete milk in response to hormonal stimulation, and this may persist even up to 1 year after pregnancy and cessation of breast feeding. \par \par We also discussed avoidance of nipple stimulation or trying to elicit discharge on purpose, which can stimulate more discharge. However given the description that the output is no longer milky but clear and spontaneous, I would recommend further imaging workup. Previous mammogram and US from 3 months ago was negative. MRI can be another imaging modality to rule out underlying malignancy causing nipple discharge.\par \par Following our discussion, the patient will monitor her nipple discharge for now, taking care not to squeeze to elicit more output. All questions were answered to the patient's satisfaction. She knows to call and return for a follow-up visit if there are any changes to the quality or quantity of discharge from the Right nipple, or if any new breast complaints such as a mass or skin changes develop.\par  \par Plan:\par  - MRI\par

## 2021-12-07 NOTE — CONSULT LETTER
[Dear  ___] : Dear  [unfilled], [Consult Letter:] : I had the pleasure of evaluating your patient, [unfilled]. [Please see my note below.] : Please see my note below. [Consult Closing:] : Thank you very much for allowing me to participate in the care of this patient.  If you have any questions, please do not hesitate to contact me. [Sincerely,] : Sincerely, [FreeTextEntry3] : Elissa Walker MD\par Breast Surgeon\par Division of Surgical Oncology\par Department of Surgery\par [address] [place] [address]\[place] [address] \par Tel: [phone]\par Fax: [phone]\par Email: [email or web address]  [DrJonah  ___] : Dr. BLANCO

## 2021-12-07 NOTE — HISTORY OF PRESENT ILLNESS
[FreeTextEntry1] : The patient is a 31 year old female referred for consultation by Dr. Karina Nur for nipple discharge.\par \par She reports noticing nipple discharge a few months ago, R> L. Nipple discharge was white/creamy. Discharge was noted on her shirt, denies squeezing on that side. She had squeezed on her left side. She got imaging done:\par \par 9/21/2021 B/L DM (NW) TC 11.8%, extremely dense\par  - Neg\par \par 9/21/2021 B/L US\par  - B/L neg\par  - BR1\par \par Afterward, the patient was advised not to squeeze and she did not. Approximately 2 weeks ago, getting out of the shower, she noticed some discharge from the right breast (spontaneous), clear yellow, not milky. Since then, she has been squeezing both sides and getting discharge.\par \par The patient most recently saw Dr. Sebas Carney for a R breast mass noticed while breastfeeding. US evaluation at the time did not reveal any abnormalities. The breast mass is still present per patient, has not changed size.\par \par Denies family history of breast cancer. Reports a paternal GM with colon cancer, age 60. Also paternal GF age (unsure age at diagnosis) and paternal uncle age 50s- prostate cancer.

## 2021-12-07 NOTE — PHYSICAL EXAM
[Normocephalic] : normocephalic [Atraumatic] : atraumatic [EOMI] : extra ocular movement intact [PERRL] : pupils equal, round and reactive to light [Sclera nonicteric] : sclera nonicteric [Supple] : supple [No Supraclavicular Adenopathy] : no supraclavicular adenopathy [No Cervical Adenopathy] : no cervical adenopathy [No Thyromegaly] : no thyromegaly [Examined in the supine and seated position] : examined in the supine and seated position [Symmetrical] : symmetrical [Bra Size: ___] : Bra Size: [unfilled] [None] : no ptosis [No dominant masses] : no dominant masses in right breast  [No dominant masses] : no dominant masses left breast [No Nipple Retraction] : no left nipple retraction [No Nipple Discharge] : no left nipple discharge [Breast Mass Right Breast ___cm] : no masses [Breast Mass Left Breast ___cm] : no masses [Breast Nipple Inversion] : nipples not inverted [Breast Nipple Retraction] : nipples not retracted [Breast Nipple Flattening] : nipples not flattened [Breast Nipple Fissures] : nipples not fissured [Breast Abnormal Lactation (Galactorrhea)] : no galactorrhea [Breast Abnormal Secretion Bloody Fluid] : no bloody discharge [Breast Abnormal Secretion Serous Fluid] : serous discharge [Breast Abnormal Secretion Opalescent Fluid] : no milky discharge [No Axillary Lymphadenopathy] : no left axillary lymphadenopathy [No Edema] : no edema [No Ulceration] : no ulceration [de-identified] : non-labored respirations  [de-identified] : B/L fibronodular tissue throughout [de-identified] : upper inner breast with fibronodular tissue, no discrete mass [de-identified] : diffuse maculopapular rash upper chest, between breasts and underneath--pt reports sensitive skin

## 2021-12-07 NOTE — PAST MEDICAL HISTORY
[Menstruating] : The patient is menstruating [Menarche Age ____] : age at menarche was [unfilled] [History of Hormone Replacement Treatment] : has no history of hormone replacement treatment [Definite ___ (Date)] : the last menstrual period was [unfilled] [Regular Cycle Intervals] : have been regular [Total Preg ___] : G[unfilled] [Live Births ___] : P[unfilled]  [Age At Live Birth ___] : Age at live birth: [unfilled] [FreeTextEntry6] : none [FreeTextEntry7] : 10 years [FreeTextEntry8] : 16 months

## 2022-11-07 ENCOUNTER — NON-APPOINTMENT (OUTPATIENT)
Age: 32
End: 2022-11-07

## 2022-11-21 NOTE — DISCHARGE NOTE OB - BREASTFEEDING PROVIDES MATERNAL HEALTH BENEFITS, DECREASED PREMENOPAUSAL BREAST CANCER, OVARIAN CANCER AND TYPE II DIABETES MELLITUS
[facility] Progress Note      Patient: Carlos Enrique Healy Date: 2022   : 1950 Attending: Rashad Coley MD   72 year old male        Reason for consultation and follow up:   Acute in CKD     Carlos Enrique is a 72 year old male with past medical history of A. fib, chronic kidney disease, congestive heart failure came to the ED with complaint of generalized weakness and dyspnea.  Patient was found to be hypotensive with elevated potassium, lactic acid and creatinine.  He was also found to have a hemoglobin of 6.8.  We are asked to help in management of his QUINTIN    Subjective:   Confused, moaning this AM  PA catheter recalibrated--filling pressures elevated    Reviewed: Allergies, Medical History and Surgical History    Vital Last Value 24 Hour Range   Temperature 97.9 °F (36.6 °C) (22 1015) Temp  Min: 97 °F (36.1 °C)  Max: 101.3 °F (38.5 °C)   Pulse (!) 110 (22 1015) Pulse  Min: 73  Max: 114   Respiratory (!) 33 (22 1015) Resp  Min: 12  Max: 33   Non-Invasive  Blood Pressure 104/80 (22 1015) BP  Min: 79/52  Max: 138/77   Arterial   Blood Pressure 101/48 (22 1000) Arterial Line BP  Min: 77/56  Max: 132/71   Pulse Oximetry 99 % (22 1000) SpO2  Min: 96 %  Max: 100 %     Vital Today Admitted   Weight 106.7 kg (235 lb 3.7 oz) (22 0600) Weight: 113.6 kg (250 lb 7.1 oz) (22 0755)   Height N/A Height: 5' 5\" (165.1 cm) (22 0900)   BMI N/A BMI (Calculated): 38.08 (22 1145)     Weight over the past 48 Hours:  Patient Vitals for the past 48 hrs:   Weight   22 0507 105.3 kg (232 lb 2.3 oz)   22 0600 106.7 kg (235 lb 3.7 oz)        Hemodynamics:      Last Value 24 Hour Range   CVP (!) 20 mmHg (22 1000) CVP (mmHg)  Min: 15 mmHg  Max: 40 mmHg   PAS/PAD (!) 56/25 (22 1000) PAP (mmHg)  Min: 29/19  Max: 66/56   PCWP   No data recorded   CO 6.6 l/min (22 1000) Cardiac Output (l/min)  Min: 3.9  l/min  Max: 7 l/min   CI 3.2 l/min/m2 (11/21/22 1000) Cardiac Index (l/min/m2)  Min: 1.9 l/min/m2  Max: 3.4 l/min/m2   SV02 (!) 49 % (11/21/22 1000) SVO2  Min: 44 %  Max: 85 %     Intake/Output:    Last Stool Occurrence:      I/O this shift:  In: 561.1 [I.V.:147.1; Blood:314; IV Piggyback:100]  Out: 345 [Urine:345]    I/O last 3 completed shifts:  In: 2323 [P.O.:680; I.V.:894.9; Blood:281; IV Piggyback:467.1]  Out: 2395 [Urine:2395]      Intake/Output Summary (Last 24 hours) at 11/21/2022 1102  Last data filed at 11/21/2022 1100  Gross per 24 hour   Intake 2533.45 ml   Output 2005 ml   Net 528.45 ml       Review of Systems:  Review of Systems   Constitutional: Positive for fatigue.        Medications/Infusions:  Scheduled:   Current Facility-Administered Medications   Medication Dose Route Frequency Provider Last Rate Last Admin   • ipratropium-albuterol (DUONEB) 0.5-2.5 (3) MG/3ML nebulizer solution 3 mL  3 mL Nebulization 4x Daily Resp Kim Hines CNP       • QUEtiapine (SEROquel) tablet 50 mg  50 mg Oral Nightly Kim Hines CNP       • methylPREDNISolone (SOLU-Medrol) PF injection 24 mg  24 mg Intravenous Daily Gia Tsang NP       • pantoprazole (PROTONIX) EC tablet 40 mg  40 mg Oral QAM AC Zee Baugh NP   40 mg at 11/21/22 0624   • ferrous sulfate (65 mg Fe per 325 mg) tablet 325 mg  325 mg Oral Daily with breakfast Zee Baugh NP   325 mg at 11/21/22 1049   • pravastatin (PRAVACHOL) tablet 40 mg  40 mg Oral Daily Zee Baugh NP   40 mg at 11/21/22 1049   • docusate sodium-sennosides (SENOKOT S) 50-8.6 MG 1 tablet  1 tablet Oral Nightly Zee Baugh NP   1 tablet at 11/20/22 2113   • sodium chloride (PF) 0.9 % injection 2 mL  2 mL Intracatheter 2 times per day Cholo Nieves MD   2 mL at 11/21/22 1052       Continuous Infusions:   Current Facility-Administered Medications   Medication Dose Route Frequency Provider Last Rate Last Admin   • sodium chloride 0.9% infusion    Intravenous Continuous PRN Favian Feliciano MD       • bumetanide (BUMEX) 12.5 mg/50 mL infusion  1 mg/hr Intravenous Continuous Jesus Traylor MD       • NORepinephrine (LEVOPHED) 8 mg/250 mL in dextrose 5 % infusion  0-10 mcg/min Intravenous Continuous Favian Feliciano MD 7.5 mL/hr at 11/21/22 1000 4 mcg/min at 11/21/22 1000   • vasopressin (VASOSTRICT) 20 unit/100 mL dextrose 5 % infusion  0-0.04 Units/min Intravenous Continuous Rashad Coley MD   Completed at 11/21/22 0809   • AMIODarone (CORDARONE/NEXTERONE) 360 mg in dextrose 200 mL infusion  0.5 mg/min Intravenous Continuous Zee Baugh NP 16.7 mL/hr at 11/21/22 1045 0.5 mg/min at 11/21/22 1045   • sodium chloride 0.9% infusion   Intravenous Continuous PRN Cholo Nieves MD       • dexMEDEtomidine (PRECEDEX) 400 mcg/100 mL in sodium chloride 0.9 % infusion  0-0.6 mcg/kg/hr (Dosing Weight) Intravenous Continuous Zee Baugh NP   Completed at 11/21/22 0748   • sodium chloride 0.9% infusion   Intravenous Continuous PRN Zee Baugh NP 10 mL/hr at 11/21/22 1000 Rate Verify at 11/21/22 1000   • sodium chloride 0.9% infusion   Intravenous Continuous PRN Rashad Coley MD            Physical Exam  Constitutional:       General: He is in acute distress.      Appearance: He is ill-appearing.   HENT:      Head: Normocephalic.      Nose: Nose normal.      Mouth/Throat:      Mouth: Mucous membranes are moist.      Neck: Normal range of motion.   Eyes:      Pupils: Pupils are equal, round, and reactive to light.   Cardiovascular:      Rate and Rhythm: Tachycardia present. Rhythm irregular.   Pulmonary:      Comments: Decreased at b/l bases   Abdominal:      Palpations: Abdomen is soft.      Comments: Mild ascitics    Musculoskeletal:         General: Swelling present.      Comments: Chronic skin changes    Neurological:      Mental Status: He is oriented to person, place, and time.       Laboratory Results:  Recent Labs   Lab 11/21/22  8643  11/20/22  1725 11/20/22  0359 11/19/22  1808 11/19/22  0446 11/18/22  1448 11/18/22  0802   SODIUM 145 142 143 142 143   < > 141   POTASSIUM 4.1 4.1 4.4 4.6 4.8   < > 5.4*   CHLORIDE 117* 115* 115* 115* 115*   < > 114*   CO2 20* 21 19* 20* 17*   < > 16*   ANIONGAP 12 10 13 12 16   < > 16   BUN 94* 101* 102* 98* 106*   < > 108*   CREATININE 2.32* 2.46* 2.66* 2.73* 2.89*   < > 2.92*   CALCIUM 8.1* 8.2* 8.1* 8.2* 8.4   < > 8.5   GLUCOSE 142* 118* 174* 183* 118*   < > 80   CPK  --   --   --   --  646*  --  1,313*   WBC 7.7  --  7.3 8.9 9.8   < > 6.7   HCT 24.5*  --  22.2* 23.5* 25.4*   < > 21.5*    < > = values in this interval not displayed.     Recent Labs   Lab 11/21/22  0403 11/20/22  0359 11/19/22  1808 11/19/22  0446 11/18/22  2024 11/18/22  1047 11/18/22  0802   HGB 7.7* 7.0* 7.4* 7.9*   < >  --  6.2*    194 226 260   < >  --  361   INR  --   --   --   --   --   --  1.3   PTT  --   --   --   --   --   --  25   MG 2.6* 2.8*  --  3.0*  --   --  3.2*   PHOS 3.6 4.1  --  5.2*  --   --  6.0*   ALKPT 65 117  --  81  --   --  93   BILIRUBIN 0.6 0.5  --  1.0  --   --  0.7   AST 25 38*  --  69*  --   --  83*   GPT 26 31  --  37  --   --  36   ALBUMIN 3.0* 2.8*  --  2.7*  --   --  2.8*   LACTA  --   --   --   --   --  6.4* 6.6*    < > = values in this interval not displayed.       Urine Panel  Recent Labs   Lab 11/18/22  1023   MARIE 10   UKET Negative   USPG 1.017   UPROT 30 *   UWBC Negative   URBC Negative   UBILI Negative   UPH 5.0     Ct abdomen   1.  Cirrhotic morphology of the liver and likely related moderate volume  low-density ascites throughout the abdomen/pelvis.  Anasarca of the  visualized soft tissues may also be related to underlying cirrhosis and/or  hyperalbuminemia.  Correlation for CHF/volume overload is nevertheless  advised.     Echo        1. Left ventricle: The cavity size is normal. Wall thickness is normal. The     ejection fraction was measured by biplane method of disks. The ejection      fraction is 50%.  2. Ventricular septum: There is diastolic flattening and systolic flattening.  3. Left atrium: The atrium is moderately dilated.  4. Right ventricle: The cavity size is mildly dilated. Wall thickness is     normal. Systolic function is normal. Indeterminant due to the wide open of     tricuspid regurgitation Pacemaker lead noted in right ventricle.  5. Right atrium: The atrium is severely dilated. Pacemaker lead noted in right     atrium.  6. Tricuspid valve: There is wide-open regurgitation.  7. Inferior vena cava: The IVC is dilated. Respirophasic diameter changes are     blunted (< 50%).  8. Pericardium, extracardiac: There is no pericardial effusion.  9. Tachycardia.     Assessment:  Acute on CKD stage 3b-etiology likely secondary to decreased effective volume versus hypoperfusion  Hyperkalemia-due to QUINTIN.  Patient was also on Entresto  Anemia - acute on CKD   H/o Cardiomyopathy .  Previous echo showed EF of 25%  H/o Afib with RVR  Lactic acidosis likely due to hypoperfusion  History of coronary artery disease status post PCI  History of MGUS  History of tricuspid regurgitation  Cirrhosis with mild ascites   Hyperphosphatemia - due to QUINTIN/CKD    Plan:  1. Optimize renal perfusion, continue vasopressin infusion to maintain MAP-RAP ~ 50 mm Hg or higher   2. Escalate diuresis--bumetanide infusion @ 1 mg/hr  3. Steroids for acute gout  4. Follow sCr, volume status    Discussed with bedside RN  Discussed with CCM team    ANNITA Traylor MD  11/21/2022     Statement Selected

## 2023-03-20 ENCOUNTER — NON-APPOINTMENT (OUTPATIENT)
Age: 33
End: 2023-03-20

## 2023-06-29 ENCOUNTER — APPOINTMENT (OUTPATIENT)
Dept: ULTRASOUND IMAGING | Facility: CLINIC | Age: 33
End: 2023-06-29
Payer: COMMERCIAL

## 2023-06-29 PROCEDURE — 76700 US EXAM ABDOM COMPLETE: CPT

## 2023-12-15 ENCOUNTER — APPOINTMENT (OUTPATIENT)
Dept: OBGYN | Facility: CLINIC | Age: 33
End: 2023-12-15

## 2024-01-04 NOTE — DISCHARGE NOTE OB - STOOL SOFTENER OR LAXATIVE AS DIRECTED BY YOUR HEALTHCARE PROVIDER UNTIL EPISIOTOMY OR TEAR REPAIR HEALS.  REFER TO MEDICATION DISCHARGE FORM
I reviewed the H&P, I examined the patient, and there are no changes in the patient's condition.  Cardiac examination with regular rate and rhythm and S1 and S2 were intact.  Lungs were clear to auscultation bilaterally.  Abdomen soft nondistended nontender.  Neurologic examination was grossly nonfocal.  Patient understands risks benefits and alternatives and would like to proceed with colonoscopy as planned.   Statement Selected

## 2024-03-14 ENCOUNTER — NON-APPOINTMENT (OUTPATIENT)
Age: 34
End: 2024-03-14

## 2024-06-02 ENCOUNTER — NON-APPOINTMENT (OUTPATIENT)
Age: 34
End: 2024-06-02

## 2024-06-11 PROBLEM — Z78.9 OTHER SPECIFIED HEALTH STATUS: Chronic | Status: ACTIVE | Noted: 2020-05-30

## 2024-06-18 ENCOUNTER — APPOINTMENT (OUTPATIENT)
Dept: ULTRASOUND IMAGING | Facility: CLINIC | Age: 34
End: 2024-06-18
Payer: COMMERCIAL

## 2024-06-18 PROCEDURE — 76536 US EXAM OF HEAD AND NECK: CPT

## 2024-10-17 ENCOUNTER — APPOINTMENT (OUTPATIENT)
Dept: OBGYN | Facility: CLINIC | Age: 34
End: 2024-10-17
Payer: COMMERCIAL

## 2024-10-17 VITALS
BODY MASS INDEX: 18.33 KG/M2 | WEIGHT: 110 LBS | HEIGHT: 65 IN | SYSTOLIC BLOOD PRESSURE: 112 MMHG | DIASTOLIC BLOOD PRESSURE: 60 MMHG

## 2024-10-17 DIAGNOSIS — N94.10 UNSPECIFIED DYSPAREUNIA: ICD-10-CM

## 2024-10-17 DIAGNOSIS — R10.2 PELVIC AND PERINEAL PAIN: ICD-10-CM

## 2024-10-17 DIAGNOSIS — Z01.419 ENCOUNTER FOR GYNECOLOGICAL EXAMINATION (GENERAL) (ROUTINE) W/OUT ABNORMAL FINDINGS: ICD-10-CM

## 2024-10-17 PROCEDURE — 99395 PREV VISIT EST AGE 18-39: CPT

## 2024-10-17 RX ORDER — BUPROPION HYDROCHLORIDE 300 MG/1
300 TABLET, EXTENDED RELEASE ORAL
Refills: 0 | Status: ACTIVE | COMMUNITY

## 2024-10-19 LAB — HPV HIGH+LOW RISK DNA PNL CVX: NOT DETECTED

## 2024-10-21 ENCOUNTER — APPOINTMENT (OUTPATIENT)
Dept: ULTRASOUND IMAGING | Facility: CLINIC | Age: 34
End: 2024-10-21
Payer: COMMERCIAL

## 2024-10-21 PROCEDURE — 76830 TRANSVAGINAL US NON-OB: CPT

## 2024-10-22 LAB — CYTOLOGY CVX/VAG DOC THIN PREP: ABNORMAL

## 2024-12-21 ENCOUNTER — NON-APPOINTMENT (OUTPATIENT)
Age: 34
End: 2024-12-21

## 2025-03-11 ENCOUNTER — APPOINTMENT (OUTPATIENT)
Dept: OBGYN | Facility: CLINIC | Age: 35
End: 2025-03-11
Payer: COMMERCIAL

## 2025-03-11 VITALS
WEIGHT: 109.6 LBS | BODY MASS INDEX: 18.24 KG/M2 | DIASTOLIC BLOOD PRESSURE: 70 MMHG | SYSTOLIC BLOOD PRESSURE: 120 MMHG | OXYGEN SATURATION: 98 % | HEART RATE: 99 BPM

## 2025-03-11 VITALS
WEIGHT: 110 LBS | BODY MASS INDEX: 18.33 KG/M2 | SYSTOLIC BLOOD PRESSURE: 110 MMHG | DIASTOLIC BLOOD PRESSURE: 66 MMHG | HEIGHT: 65 IN

## 2025-03-11 DIAGNOSIS — N93.9 ABNORMAL UTERINE AND VAGINAL BLEEDING, UNSPECIFIED: ICD-10-CM

## 2025-03-11 DIAGNOSIS — R10.2 PELVIC AND PERINEAL PAIN: ICD-10-CM

## 2025-03-11 DIAGNOSIS — Z30.432 ENCOUNTER FOR REMOVAL OF INTRAUTERINE CONTRACEPTIVE DEVICE: ICD-10-CM

## 2025-03-11 PROCEDURE — 99205 OFFICE O/P NEW HI 60 MIN: CPT

## 2025-03-11 PROCEDURE — 58301 REMOVE INTRAUTERINE DEVICE: CPT

## 2025-04-14 ENCOUNTER — APPOINTMENT (OUTPATIENT)
Dept: MRI IMAGING | Facility: CLINIC | Age: 35
End: 2025-04-14
Payer: COMMERCIAL

## 2025-04-14 PROCEDURE — A9585: CPT

## 2025-04-14 PROCEDURE — 72197 MRI PELVIS W/O & W/DYE: CPT

## 2025-04-21 ENCOUNTER — APPOINTMENT (OUTPATIENT)
Dept: OBGYN | Facility: CLINIC | Age: 35
End: 2025-04-21
Payer: COMMERCIAL

## 2025-04-21 VITALS
HEIGHT: 65 IN | BODY MASS INDEX: 18.49 KG/M2 | DIASTOLIC BLOOD PRESSURE: 64 MMHG | SYSTOLIC BLOOD PRESSURE: 108 MMHG | WEIGHT: 111 LBS

## 2025-04-21 DIAGNOSIS — N93.9 ABNORMAL UTERINE AND VAGINAL BLEEDING, UNSPECIFIED: ICD-10-CM

## 2025-04-21 PROCEDURE — 99215 OFFICE O/P EST HI 40 MIN: CPT

## 2025-05-19 ENCOUNTER — APPOINTMENT (OUTPATIENT)
Dept: OBGYN | Facility: CLINIC | Age: 35
End: 2025-05-19
Payer: COMMERCIAL

## 2025-05-19 VITALS
WEIGHT: 108 LBS | BODY MASS INDEX: 17.99 KG/M2 | DIASTOLIC BLOOD PRESSURE: 62 MMHG | HEIGHT: 65 IN | SYSTOLIC BLOOD PRESSURE: 110 MMHG

## 2025-05-19 DIAGNOSIS — Z01.818 ENCOUNTER FOR OTHER PREPROCEDURAL EXAMINATION: ICD-10-CM

## 2025-05-19 PROCEDURE — 99215 OFFICE O/P EST HI 40 MIN: CPT

## 2025-05-22 ENCOUNTER — OUTPATIENT (OUTPATIENT)
Dept: OUTPATIENT SERVICES | Facility: HOSPITAL | Age: 35
LOS: 1 days | End: 2025-05-22
Payer: COMMERCIAL

## 2025-05-22 VITALS
OXYGEN SATURATION: 100 % | HEART RATE: 68 BPM | HEIGHT: 65 IN | WEIGHT: 108.47 LBS | RESPIRATION RATE: 18 BRPM | TEMPERATURE: 98 F | DIASTOLIC BLOOD PRESSURE: 71 MMHG | SYSTOLIC BLOOD PRESSURE: 106 MMHG

## 2025-05-22 DIAGNOSIS — Z98.890 OTHER SPECIFIED POSTPROCEDURAL STATES: Chronic | ICD-10-CM

## 2025-05-22 DIAGNOSIS — Z01.818 ENCOUNTER FOR OTHER PREPROCEDURAL EXAMINATION: ICD-10-CM

## 2025-05-22 DIAGNOSIS — N93.9 ABNORMAL UTERINE AND VAGINAL BLEEDING, UNSPECIFIED: ICD-10-CM

## 2025-05-22 DIAGNOSIS — R10.2 PELVIC AND PERINEAL PAIN: ICD-10-CM

## 2025-05-22 LAB
ANION GAP SERPL CALC-SCNC: 8 MMOL/L — SIGNIFICANT CHANGE UP (ref 5–17)
APPEARANCE UR: CLEAR — SIGNIFICANT CHANGE UP
BACTERIA # UR AUTO: ABNORMAL /HPF
BASOPHILS # BLD AUTO: 0.04 K/UL — SIGNIFICANT CHANGE UP (ref 0–0.2)
BASOPHILS NFR BLD AUTO: 0.5 % — SIGNIFICANT CHANGE UP (ref 0–2)
BILIRUB UR-MCNC: NEGATIVE — SIGNIFICANT CHANGE UP
BLD GP AB SCN SERPL QL: SIGNIFICANT CHANGE UP
BUN SERPL-MCNC: 10 MG/DL — SIGNIFICANT CHANGE UP (ref 7–23)
CALCIUM SERPL-MCNC: 9.7 MG/DL — SIGNIFICANT CHANGE UP (ref 8.4–10.5)
CHLORIDE SERPL-SCNC: 106 MMOL/L — SIGNIFICANT CHANGE UP (ref 96–108)
CO2 SERPL-SCNC: 29 MMOL/L — SIGNIFICANT CHANGE UP (ref 22–31)
COLOR SPEC: YELLOW — SIGNIFICANT CHANGE UP
CREAT SERPL-MCNC: 0.97 MG/DL — SIGNIFICANT CHANGE UP (ref 0.5–1.3)
DIFF PNL FLD: NEGATIVE — SIGNIFICANT CHANGE UP
EGFR: 78 ML/MIN/1.73M2 — SIGNIFICANT CHANGE UP
EGFR: 78 ML/MIN/1.73M2 — SIGNIFICANT CHANGE UP
EOSINOPHIL # BLD AUTO: 0.03 K/UL — SIGNIFICANT CHANGE UP (ref 0–0.5)
EOSINOPHIL NFR BLD AUTO: 0.3 % — SIGNIFICANT CHANGE UP (ref 0–6)
EPI CELLS # UR: SIGNIFICANT CHANGE UP
GLUCOSE SERPL-MCNC: 92 MG/DL — SIGNIFICANT CHANGE UP (ref 70–99)
GLUCOSE UR QL: NEGATIVE MG/DL — SIGNIFICANT CHANGE UP
HCG SERPL-ACNC: <1 MIU/ML — SIGNIFICANT CHANGE UP
HCT VFR BLD CALC: 41.5 % — SIGNIFICANT CHANGE UP (ref 34.5–45)
HGB BLD-MCNC: 14.2 G/DL — SIGNIFICANT CHANGE UP (ref 11.5–15.5)
HYALINE CASTS # UR AUTO: SIGNIFICANT CHANGE UP
IMM GRANULOCYTES NFR BLD AUTO: 0.5 % — SIGNIFICANT CHANGE UP (ref 0–0.9)
KETONES UR QL: NEGATIVE MG/DL — SIGNIFICANT CHANGE UP
LEUKOCYTE ESTERASE UR-ACNC: ABNORMAL
LYMPHOCYTES # BLD AUTO: 1.92 K/UL — SIGNIFICANT CHANGE UP (ref 1–3.3)
LYMPHOCYTES # BLD AUTO: 22.4 % — SIGNIFICANT CHANGE UP (ref 13–44)
MCHC RBC-ENTMCNC: 31.2 PG — SIGNIFICANT CHANGE UP (ref 27–34)
MCHC RBC-ENTMCNC: 34.2 G/DL — SIGNIFICANT CHANGE UP (ref 32–36)
MCV RBC AUTO: 91.2 FL — SIGNIFICANT CHANGE UP (ref 80–100)
MONOCYTES # BLD AUTO: 0.51 K/UL — SIGNIFICANT CHANGE UP (ref 0–0.9)
MONOCYTES NFR BLD AUTO: 5.9 % — SIGNIFICANT CHANGE UP (ref 2–14)
NEUTROPHILS # BLD AUTO: 6.05 K/UL — SIGNIFICANT CHANGE UP (ref 1.8–7.4)
NEUTROPHILS NFR BLD AUTO: 70.4 % — SIGNIFICANT CHANGE UP (ref 43–77)
NITRITE UR-MCNC: NEGATIVE — SIGNIFICANT CHANGE UP
NRBC BLD AUTO-RTO: 0 /100 WBCS — SIGNIFICANT CHANGE UP (ref 0–0)
PH UR: 8 — SIGNIFICANT CHANGE UP (ref 5–8)
PLATELET # BLD AUTO: 310 K/UL — SIGNIFICANT CHANGE UP (ref 150–400)
POTASSIUM SERPL-MCNC: 4.5 MMOL/L — SIGNIFICANT CHANGE UP (ref 3.5–5.3)
POTASSIUM SERPL-SCNC: 4.5 MMOL/L — SIGNIFICANT CHANGE UP (ref 3.5–5.3)
PROT UR-MCNC: NEGATIVE MG/DL — SIGNIFICANT CHANGE UP
RBC # BLD: 4.55 M/UL — SIGNIFICANT CHANGE UP (ref 3.8–5.2)
RBC # FLD: 12.5 % — SIGNIFICANT CHANGE UP (ref 10.3–14.5)
RBC CASTS # UR COMP ASSIST: 0 /HPF — SIGNIFICANT CHANGE UP (ref 0–4)
SODIUM SERPL-SCNC: 143 MMOL/L — SIGNIFICANT CHANGE UP (ref 135–145)
SP GR SPEC: 1.01 — SIGNIFICANT CHANGE UP (ref 1–1.03)
UROBILINOGEN FLD QL: 0.2 MG/DL — SIGNIFICANT CHANGE UP (ref 0.2–1)
WBC # BLD: 8.59 K/UL — SIGNIFICANT CHANGE UP (ref 3.8–10.5)
WBC # FLD AUTO: 8.59 K/UL — SIGNIFICANT CHANGE UP (ref 3.8–10.5)
WBC UR QL: 6 /HPF — HIGH (ref 0–5)

## 2025-05-22 PROCEDURE — 84702 CHORIONIC GONADOTROPIN TEST: CPT

## 2025-05-22 PROCEDURE — 81001 URINALYSIS AUTO W/SCOPE: CPT

## 2025-05-22 PROCEDURE — 86900 BLOOD TYPING SEROLOGIC ABO: CPT

## 2025-05-22 PROCEDURE — 87086 URINE CULTURE/COLONY COUNT: CPT

## 2025-05-22 PROCEDURE — 85025 COMPLETE CBC W/AUTO DIFF WBC: CPT

## 2025-05-22 PROCEDURE — 86850 RBC ANTIBODY SCREEN: CPT

## 2025-05-22 PROCEDURE — G0463: CPT

## 2025-05-22 PROCEDURE — 86901 BLOOD TYPING SEROLOGIC RH(D): CPT

## 2025-05-22 PROCEDURE — 36415 COLL VENOUS BLD VENIPUNCTURE: CPT

## 2025-05-22 PROCEDURE — 80048 BASIC METABOLIC PNL TOTAL CA: CPT

## 2025-05-22 NOTE — H&P PST ADULT - SOURCE OF INFORMATION, PROFILE
Date of Service: 11/12/2018    SUBJECTIVE:  The patient returns.  She is here following up for her shoulder.  She is getting better.  Still has a little difficulty sleeping at night, occasionally takes some Ibuprofen.  No Vicodin.  She is out of therapy now and doing it pretty much on her own.    PHYSICAL EXAMINATION:  Her motion is close to full compared to the contralateral side.    RECOMMENDATION:  It sounds like her adhesive capsulitis is resolving.  At this point, I would recommend just continuing to stretch out the shoulder and see me back only if it stops getting better.      Dictated By: Nader Yang MD  Signing Provider: Nader Yang MD    OD/SP2 ([id number])  DD: 11/12/2018 15:30:29 TD: 11/13/2018 16:24:22    Copy Sent To:    patient

## 2025-05-22 NOTE — H&P PST ADULT - NEUROLOGICAL
normal/cranial nerves II-XII intact/sensation intact/responds to pain/responds to verbal commands/no spontaneous movement No negative

## 2025-05-22 NOTE — H&P PST ADULT - NSICDXPASTMEDICALHX_GEN_ALL_CORE_FT
PAST MEDICAL HISTORY:  Abnormal uterine bleeding     Anxiety and depression     Chronic sinusitis     Deviated nasal septum     Migraine     No pertinent past medical history

## 2025-05-22 NOTE — H&P PST ADULT - NSANTHOSAYNRD_GEN_A_CORE
neck 12.5 inches/No. CLIFFORD screening performed.  STOP BANG Legend: 0-2 = LOW Risk; 3-4 = INTERMEDIATE Risk; 5-8 = HIGH Risk

## 2025-05-22 NOTE — H&P PST ADULT - HISTORY OF PRESENT ILLNESS
heavy menses with pain and cramping  Patient is a 34 year old F with no pertinent medical history presents for perioperative testing for robotic excision of endometriosis possible laparotomy hysteroscopy D&C, cystoscopy with Dr. Juarez scheduled for 05/28/2025. Reports heavy menses with pain and cramping, therefore recommended for upcoming surgery. Denies dysuria or any acute symptoms at this time. Patient otherwise feels well overall.

## 2025-05-23 LAB
CULTURE RESULTS: SIGNIFICANT CHANGE UP
SPECIMEN SOURCE: SIGNIFICANT CHANGE UP

## 2025-05-27 NOTE — ASU PATIENT PROFILE, ADULT - NSICDXPASTMEDICALHX_GEN_ALL_CORE_FT
PAST MEDICAL HISTORY:  Abnormal uterine bleeding     Anxiety and depression     Chronic sinusitis     Deviated nasal septum     Migraine

## 2025-05-27 NOTE — ASU PATIENT PROFILE, ADULT - PATIENT KNOW
yes
Nutrition-related concerns:   - S/p hip surgery 2/19.   - Endo: BG being managed with Lispro SSI. Finger sticks noted elevated today: 241, 224 mg/dl.   - Renal: Pt with RASHAAD on CKD stage 5. Pt ordered for Pacerone in house.

## 2025-05-28 ENCOUNTER — APPOINTMENT (OUTPATIENT)
Dept: OBGYN | Facility: HOSPITAL | Age: 35
End: 2025-05-28

## 2025-05-28 ENCOUNTER — TRANSCRIPTION ENCOUNTER (OUTPATIENT)
Age: 35
End: 2025-05-28

## 2025-05-28 ENCOUNTER — OUTPATIENT (OUTPATIENT)
Dept: OUTPATIENT SERVICES | Facility: HOSPITAL | Age: 35
LOS: 1 days | End: 2025-05-28
Payer: COMMERCIAL

## 2025-05-28 ENCOUNTER — RESULT REVIEW (OUTPATIENT)
Age: 35
End: 2025-05-28

## 2025-05-28 VITALS
HEART RATE: 78 BPM | SYSTOLIC BLOOD PRESSURE: 111 MMHG | OXYGEN SATURATION: 100 % | DIASTOLIC BLOOD PRESSURE: 72 MMHG | RESPIRATION RATE: 14 BRPM | TEMPERATURE: 98 F

## 2025-05-28 VITALS
SYSTOLIC BLOOD PRESSURE: 101 MMHG | RESPIRATION RATE: 16 BRPM | TEMPERATURE: 98 F | DIASTOLIC BLOOD PRESSURE: 87 MMHG | HEART RATE: 72 BPM | OXYGEN SATURATION: 99 % | HEIGHT: 65 IN | WEIGHT: 108.47 LBS

## 2025-05-28 DIAGNOSIS — R10.2 PELVIC AND PERINEAL PAIN: ICD-10-CM

## 2025-05-28 DIAGNOSIS — N93.9 ABNORMAL UTERINE AND VAGINAL BLEEDING, UNSPECIFIED: ICD-10-CM

## 2025-05-28 DIAGNOSIS — Z98.890 OTHER SPECIFIED POSTPROCEDURAL STATES: Chronic | ICD-10-CM

## 2025-05-28 PROBLEM — Z78.9 OTHER SPECIFIED HEALTH STATUS: Chronic | Status: INACTIVE | Noted: 2020-05-30 | Resolved: 2025-05-27

## 2025-05-28 PROCEDURE — 88305 TISSUE EXAM BY PATHOLOGIST: CPT | Mod: 26

## 2025-05-28 PROCEDURE — 58662 LAPAROSCOPY EXCISE LESIONS: CPT

## 2025-05-28 PROCEDURE — 58558 HYSTEROSCOPY BIOPSY: CPT

## 2025-05-28 PROCEDURE — C1889: CPT

## 2025-05-28 PROCEDURE — 88305 TISSUE EXAM BY PATHOLOGIST: CPT

## 2025-05-28 PROCEDURE — S2900: CPT

## 2025-05-28 PROCEDURE — S2900 ROBOTIC SURGICAL SYSTEM: CPT | Mod: NC

## 2025-05-28 PROCEDURE — 58558 HYSTEROSCOPY BIOPSY: CPT | Mod: 59

## 2025-05-28 DEVICE — ARISTA 3GR: Type: IMPLANTABLE DEVICE | Status: FUNCTIONAL

## 2025-05-28 RX ORDER — ACETAMINOPHEN 500 MG/5ML
975 LIQUID (ML) ORAL ONCE
Refills: 0 | Status: COMPLETED | OUTPATIENT
Start: 2025-05-28 | End: 2025-05-28

## 2025-05-28 RX ORDER — OXYCODONE HYDROCHLORIDE 30 MG/1
5 TABLET ORAL ONCE
Refills: 0 | Status: DISCONTINUED | OUTPATIENT
Start: 2025-05-28 | End: 2025-05-28

## 2025-05-28 RX ORDER — GABAPENTIN 400 MG/1
600 CAPSULE ORAL ONCE
Refills: 0 | Status: COMPLETED | OUTPATIENT
Start: 2025-05-28 | End: 2025-05-28

## 2025-05-28 RX ORDER — BUPROPION HYDROBROMIDE 522 MG/1
1 TABLET, EXTENDED RELEASE ORAL
Refills: 0 | DISCHARGE

## 2025-05-28 RX ORDER — OXYCODONE HYDROCHLORIDE 30 MG/1
1 TABLET ORAL
Qty: 5 | Refills: 0
Start: 2025-05-28

## 2025-05-28 RX ORDER — HEPARIN SODIUM 1000 [USP'U]/ML
5000 INJECTION INTRAVENOUS; SUBCUTANEOUS ONCE
Refills: 0 | Status: COMPLETED | OUTPATIENT
Start: 2025-05-28 | End: 2025-05-28

## 2025-05-28 RX ORDER — CELECOXIB 50 MG/1
400 CAPSULE ORAL ONCE
Refills: 0 | Status: COMPLETED | OUTPATIENT
Start: 2025-05-28 | End: 2025-05-28

## 2025-05-28 RX ORDER — HYDROMORPHONE/SOD CHLOR,ISO/PF 2 MG/10 ML
0.5 SYRINGE (ML) INJECTION
Refills: 0 | Status: DISCONTINUED | OUTPATIENT
Start: 2025-05-28 | End: 2025-05-28

## 2025-05-28 RX ORDER — SODIUM CHLORIDE 9 G/1000ML
1000 INJECTION, SOLUTION INTRAVENOUS
Refills: 0 | Status: DISCONTINUED | OUTPATIENT
Start: 2025-05-28 | End: 2025-05-28

## 2025-05-28 RX ORDER — ONDANSETRON HCL/PF 4 MG/2 ML
4 VIAL (ML) INJECTION ONCE
Refills: 0 | Status: DISCONTINUED | OUTPATIENT
Start: 2025-05-28 | End: 2025-05-28

## 2025-05-28 RX ADMIN — HEPARIN SODIUM 5000 UNIT(S): 1000 INJECTION INTRAVENOUS; SUBCUTANEOUS at 07:32

## 2025-05-28 RX ADMIN — SODIUM CHLORIDE 50 MILLILITER(S): 9 INJECTION, SOLUTION INTRAVENOUS at 07:33

## 2025-05-28 RX ADMIN — CELECOXIB 400 MILLIGRAM(S): 50 CAPSULE ORAL at 07:33

## 2025-05-28 RX ADMIN — Medication 975 MILLIGRAM(S): at 07:33

## 2025-05-28 RX ADMIN — GABAPENTIN 600 MILLIGRAM(S): 400 CAPSULE ORAL at 07:32

## 2025-05-28 RX ADMIN — Medication 975 MILLIGRAM(S): at 08:03

## 2025-05-28 RX ADMIN — CELECOXIB 400 MILLIGRAM(S): 50 CAPSULE ORAL at 08:03

## 2025-05-28 NOTE — BRIEF OPERATIVE NOTE - SPECIMENS
left uterosacral ligament biopsy, posterior cul de sac biopsy, anterior peritoneal biopsy, right paratubal cyst, endometrial curettage

## 2025-05-28 NOTE — BRIEF OPERATIVE NOTE - NSICDXBRIEFPROCEDURE_GEN_ALL_CORE_FT
PROCEDURES:  Excision, endometriosis, robot-assisted, laparoscopic, using da Harmony Xi 28-May-2025 11:21:27  Mauricio Juarez  Diagnostic hysteroscopy with dilation and curettage of uterus 28-May-2025 11:21:40  Mauricio Juarez  Cystoscopy 28-May-2025 11:21:49  Mauricio Juarez  Robot-assisted surgical removal of paratubal cyst 28-May-2025 11:24:12 Right Mauricio Juarez

## 2025-05-28 NOTE — ASU DISCHARGE PLAN (ADULT/PEDIATRIC) - ASU DC SPECIAL INSTRUCTIONSFT
Pain medication is given immediately following your surgery to help with post-operative pain. Upon  your discharge home, we suggest scheduled doses of Acetaminophen (tylenol) every 6 hours and  Ibuprofen (Motrin) every 6 hours, alternating between medications every 3 hours (i.e. Take tylenol  and wait 3 hours, then take Motrin and wait 3 hours, repeat) for the first 3-4 days after surgery. If  you are without significant pain, medications can be taken more infrequently. It is important to  follow dosing instructions on the medication bottle or prescription.  Minimally Invasive & Robotic Surgery  Postop pain medications will be prescribed.  Can take Gas and constipation medications: Colace is a stool softener and may be prescribed or bought  over the counter and should be used until bowel movements are back to normal. GasX or Mylanta  are for gas pain and can be used for gas pain. Gas X or Mylanta is over the counter medication.  Bathing: You may take a bath or shower unless otherwise told not to take a bath. It is fine to get the  incisions wet.  Constipation: Surgery, anesthesia and narcotics cause constipation. Over the counter remedies for this  includes Colace 100-200 mg twice daily, Metamucil, Milk of Magnesia 2 teaspoons every 4 hours as  needed, or Dulcolax suppositories. Stool softeners and laxatives may be necessary for quite some time  so use them as long as needed. Keep well hydrated as this also helps to reduce constipation. Dried fruit  such as prunes are also an effective natural remedy for constipation. Chewing gum also stimulates bowel  activity.  Avoid fried food, bananas, cheese and rice until normal bowel function resumes.  Vaginal discharge: It’s common for anyone undergoing vaginal surgery to have vaginal discharge. This  may be white, yellow, greenish, bloody or a combination. It may last for many weeks. If bleeding is greater than a maxipad saturated in 2 hours, call the office immediately.  Vaginal rest : Avoid sex or placing anything in the vagina for 4 weeks  Exercise: Exercise like walking is encouraged during your recovery, however, you will not be able to  resume strenuous exercise until cleared by your doctor. Avoid high impact sports such as aerobics,  running, tennis and heavy weight lifting until your first postop appointment (no more than 5 pounds).  Gradually build your strength back by walking and non- impact sports.  Pain is expected after surgery. Do take the pain meds you were prescribed as needed. If you  have certain sensitivities to narcotics, let us know because some narcotics require a special form to be  handed to the patient rather than called in.

## 2025-05-28 NOTE — ASU DISCHARGE PLAN (ADULT/PEDIATRIC) - FINANCIAL ASSISTANCE
[facility] provides services at a reduced cost to those who are determined to be eligible through [facility]’s financial assistance program. Information regarding [facility]’s financial assistance program can be found by going to [email or web address] or by calling [phone].

## 2025-05-28 NOTE — ASU DISCHARGE PLAN (ADULT/PEDIATRIC) - CALL YOUR DOCTOR IF YOU HAVE ANY OF THE FOLLOWING:
Bleeding that does not stop/Swelling that gets worse/Pain not relieved by Medications/Fever greater than (need to indicate Fahrenheit or Celsius)/Wound/Surgical Site with redness, or foul smelling discharge or pus/Numbness, tingling, color or temperature change to extremity/Nausea and vomiting that does not stop/Unable to urinate/Inability to tolerate liquids or foods/Increased irritability or sluggishness No

## 2025-05-28 NOTE — ASU DISCHARGE PLAN (ADULT/PEDIATRIC) - CARE PROVIDER_API CALL
Mauricio Juarez  Obstetrics and Gynecology  [address] [place] [address]  Phone: [phone]  Fax: [phone]  Follow Up Time: 2 weeks

## 2025-05-28 NOTE — BRIEF OPERATIVE NOTE - NSICDXBRIEFPREOP_GEN_ALL_CORE_FT
PRE-OP DIAGNOSIS:  Abnormal uterine bleeding (AUB) 28-May-2025 11:16:39  Mauricio Juarez  Pelvic pain 28-May-2025 11:16:57  Mauricio Juarez

## 2025-05-28 NOTE — BRIEF OPERATIVE NOTE - OPERATION/FINDINGS
peritoneal implants over: anterior bladder peritoneum, posterior cul de sac, left uterosacral ligament; small right paratubal cysts; normal ovaries b/l, 6cm uterus; hysteroscopy- normal uterine cavity without gross endometriosis or lesions, b/l ostia visualized; cystoscopy- normal bladder mucosa, no evidence of endometriosis

## 2025-05-28 NOTE — BRIEF OPERATIVE NOTE - NSICDXBRIEFPOSTOP_GEN_ALL_CORE_FT
POST-OP DIAGNOSIS:  Abnormal uterine bleeding (AUB) 28-May-2025 11:17:05  Mauricio Juarez  Pelvic pain 28-May-2025 11:17:12  Mauricio Juarez  Endometriosis 28-May-2025 11:17:25 peritoneal Mauricio Juarez

## 2025-05-28 NOTE — ASU PREOP CHECKLIST - HEIGHT IN FEET
HD#2  27w2d  Pt states feels occasional \"twinges\" - nothing like yesterday - slept well last night  FHT's - cat 1  Ctx's now about every 5 to 9 minutes - yesterday every 2 to 3 and painful for the pt  A/P Stable - will finish an additional 24 hours of in 5

## 2025-06-05 LAB — SURGICAL PATHOLOGY STUDY: SIGNIFICANT CHANGE UP

## 2025-06-09 ENCOUNTER — APPOINTMENT (OUTPATIENT)
Dept: OBGYN | Facility: CLINIC | Age: 35
End: 2025-06-09
Payer: COMMERCIAL

## 2025-06-09 VITALS
BODY MASS INDEX: 18.33 KG/M2 | WEIGHT: 110 LBS | HEIGHT: 65 IN | SYSTOLIC BLOOD PRESSURE: 104 MMHG | DIASTOLIC BLOOD PRESSURE: 62 MMHG

## 2025-06-09 PROBLEM — N80.9 ENDOMETRIOSIS: Status: ACTIVE | Noted: 2025-06-09

## 2025-06-09 PROBLEM — N93.9 ABNORMAL UTERINE AND VAGINAL BLEEDING, UNSPECIFIED: Chronic | Status: ACTIVE | Noted: 2025-05-22

## 2025-06-09 PROCEDURE — 99024 POSTOP FOLLOW-UP VISIT: CPT

## 2025-06-09 RX ORDER — NORETHINDRONE 0.35 MG/1
0.35 TABLET ORAL DAILY
Qty: 90 | Refills: 1 | Status: ACTIVE | COMMUNITY
Start: 2025-06-09 | End: 1900-01-01

## 2025-07-07 ENCOUNTER — APPOINTMENT (OUTPATIENT)
Dept: OBGYN | Facility: CLINIC | Age: 35
End: 2025-07-07
Payer: COMMERCIAL

## 2025-07-07 VITALS
DIASTOLIC BLOOD PRESSURE: 66 MMHG | HEIGHT: 65 IN | BODY MASS INDEX: 18.33 KG/M2 | SYSTOLIC BLOOD PRESSURE: 112 MMHG | WEIGHT: 110 LBS

## 2025-07-07 PROBLEM — Z48.89 POSTOPERATIVE VISIT: Status: ACTIVE | Noted: 2025-06-09

## 2025-07-07 PROCEDURE — 99024 POSTOP FOLLOW-UP VISIT: CPT

## 2025-09-11 ENCOUNTER — APPOINTMENT (OUTPATIENT)
Dept: HEPATOLOGY | Facility: CLINIC | Age: 35
End: 2025-09-11

## (undated) DEVICE — PACK BASIC

## (undated) DEVICE — DRSG DERMABOND 0.7ML

## (undated) DEVICE — XI ARM FORCEP MARYLAND BIPOLAR

## (undated) DEVICE — STAPLER SKIN VISI-STAT 35 WIDE

## (undated) DEVICE — RUMI TIP WHITE 6.7MM X 6CM

## (undated) DEVICE — PREP CHLORAPREP HI-LITE ORANGE 26ML

## (undated) DEVICE — FOLEY TRAY 16FR LF URINE METER SURESTEP

## (undated) DEVICE — WARMING BLANKET UPPER ADULT

## (undated) DEVICE — POSITIONER PURPLE ARM ONE STEP (LARGE)

## (undated) DEVICE — ENDOCATCH 10MM

## (undated) DEVICE — ELECTRO LUBE ANTI-STICK SOLUTION FOR CAUTERY TIP

## (undated) DEVICE — SOL IRR POUR NS 0.9% 500ML

## (undated) DEVICE — DRSG TELFA 3 X 8

## (undated) DEVICE — PREP BETADINE KIT

## (undated) DEVICE — SUT VICRYL PLUS 0 27" CT-3

## (undated) DEVICE — TUBING STRYKER HYSTEROSCOPY INFLOW OUTFLOW

## (undated) DEVICE — XI CANNULA SEAL 5MM - 8 MM

## (undated) DEVICE — UTERINE MANIPULATOR COOPER SURGICAL 5MM 33CM GREEN

## (undated) DEVICE — SPECIMEN CONTAINER 100ML

## (undated) DEVICE — DRAPE BACK TABLE COVER HEAVY DUTY 60"

## (undated) DEVICE — PACK MINOR

## (undated) DEVICE — XI DRAPE ARM

## (undated) DEVICE — SYR ASEPTO

## (undated) DEVICE — SUT VLOC 180 0 12" GS-21 GREEN

## (undated) DEVICE — DRAPE INSTRUMENT POUCH 6.75" X 11"

## (undated) DEVICE — DRAPE TOWEL BLUE 17" X 24"

## (undated) DEVICE — POSITIONER FOAM EGG CRATE ULNAR 2PCS (PINK)

## (undated) DEVICE — SUT VLOC 180 0 9" GS-21 GREEN

## (undated) DEVICE — SUT BIOSYN 4-0 18" P-12

## (undated) DEVICE — XI ARM DISSECTOR CURVED BIPOLAR 8MM

## (undated) DEVICE — LAP PAD 18 X 18"

## (undated) DEVICE — DRSG STERISTRIPS 0.5 X 4"

## (undated) DEVICE — AVETA FLUID MANAGEMENT ACCESSORY

## (undated) DEVICE — VENODYNE/SCD SLEEVE CALF LARGE

## (undated) DEVICE — MARKING PEN W RULER

## (undated) DEVICE — XI ARM FORCEP FENESTRATED BIPOLAR 8MM

## (undated) DEVICE — GLV 7 PROTEXIS (WHITE)

## (undated) DEVICE — ACCESSORY AVETA WASTE MANAGEMENT 3MM

## (undated) DEVICE — SOL INJ NS 0.9% 500ML 1-PORT

## (undated) DEVICE — SUT VLOC 180 0 6" GS-21 GREEN

## (undated) DEVICE — TUBING AIRSEAL TRI-LUMEN FILTERED

## (undated) DEVICE — XI SCISSOR TIP COVER

## (undated) DEVICE — LUBRICATING JELLY ONESHOT 1.25OZ

## (undated) DEVICE — DRAPE TOP SHEET 53" X 101"

## (undated) DEVICE — GLV 8 PROTEXIS (WHITE)

## (undated) DEVICE — XI VESSEL SEALER

## (undated) DEVICE — GLV 7.5 PROTEXIS (WHITE)

## (undated) DEVICE — DRAPE LIGHT HANDLE COVER (BLUE)

## (undated) DEVICE — TROCAR SURGIQUEST AIRSEAL 8MM X 100MM

## (undated) DEVICE — SUT POLYSORB 0 18" TIES UNDYED

## (undated) DEVICE — MEDICATION LABELS W MARKER

## (undated) DEVICE — TUBING STRYKEFLOW II SUCTION / IRRIGATOR

## (undated) DEVICE — TUBING SUCTION 20FT

## (undated) DEVICE — SOL IRR POUR H2O 250ML

## (undated) DEVICE — XI OBTURATOR OPTICAL BLADELESS 8MM

## (undated) DEVICE — SOL IRR GLYCINE 1.5% 3000L

## (undated) DEVICE — GOWN TRIMAX LG

## (undated) DEVICE — DRAPE LAVH 124" X 30" X125"

## (undated) DEVICE — GLV 8.5 PROTEXIS (WHITE)

## (undated) DEVICE — XI ARM SCISSOR MONO CURVED

## (undated) DEVICE — SYR LUER LOK 10CC

## (undated) DEVICE — TROCAR GELPOINT MINI ADVANCED

## (undated) DEVICE — BLADE SURGICAL #10 CARBON

## (undated) DEVICE — DRAPE 3/4 SHEET W REINFORCEMENT 56X77"

## (undated) DEVICE — POSITIONER PINK PAD PIGAZZI SYSTEM XL W ARM PROTECTOR

## (undated) DEVICE — AVETA CORAL HYSTEROSCOPE 4.6MM DISP

## (undated) DEVICE — DRAPE LAPAROSCOPIC FLUID CONTROL PACK

## (undated) DEVICE — DRAPE 1/2 SHEET 40X57"

## (undated) DEVICE — PACK BASIC GOWN

## (undated) DEVICE — DRAPE MAYO STAND 30"

## (undated) DEVICE — Device

## (undated) DEVICE — D HELP - CLEARVIEW CLEARIFY SYSTEM

## (undated) DEVICE — VISITEC 4X4

## (undated) DEVICE — GLV 6.5 PROTEXIS (WHITE)

## (undated) DEVICE — XI DRAPE COLUMN